# Patient Record
Sex: FEMALE | Race: WHITE | Employment: PART TIME | URBAN - METROPOLITAN AREA
[De-identification: names, ages, dates, MRNs, and addresses within clinical notes are randomized per-mention and may not be internally consistent; named-entity substitution may affect disease eponyms.]

---

## 2018-06-14 ENCOUNTER — HOSPITAL ENCOUNTER (OUTPATIENT)
Dept: INFUSION THERAPY | Age: 48
Discharge: HOME | End: 2018-06-14
Payer: COMMERCIAL

## 2018-06-14 ENCOUNTER — OFFICE VISIT (OUTPATIENT)
Dept: ONCOLOGY | Age: 48
End: 2018-06-14
Payer: COMMERCIAL

## 2018-06-14 VITALS
HEIGHT: 62 IN | DIASTOLIC BLOOD PRESSURE: 83 MMHG | TEMPERATURE: 97.6 F | RESPIRATION RATE: 20 BRPM | BODY MASS INDEX: 32.3 KG/M2 | SYSTOLIC BLOOD PRESSURE: 119 MMHG | WEIGHT: 175.5 LBS | HEART RATE: 76 BPM

## 2018-06-14 DIAGNOSIS — Z17.0 MALIGNANT NEOPLASM OF LEFT BREAST IN FEMALE, ESTROGEN RECEPTOR POSITIVE, UNSPECIFIED SITE OF BREAST (HCC): ICD-10-CM

## 2018-06-14 DIAGNOSIS — C50.012 MALIGNANT NEOPLASM OF NIPPLE OF LEFT BREAST IN FEMALE, UNSPECIFIED ESTROGEN RECEPTOR STATUS (HCC): Chronic | ICD-10-CM

## 2018-06-14 DIAGNOSIS — C50.912 MALIGNANT NEOPLASM OF LEFT BREAST IN FEMALE, ESTROGEN RECEPTOR POSITIVE, UNSPECIFIED SITE OF BREAST (HCC): ICD-10-CM

## 2018-06-14 DIAGNOSIS — C50.012 MALIGNANT NEOPLASM OF NIPPLE OF LEFT BREAST IN FEMALE, UNSPECIFIED ESTROGEN RECEPTOR STATUS (HCC): Primary | Chronic | ICD-10-CM

## 2018-06-14 LAB
ALBUMIN SERPL-MCNC: 4.7 G/DL (ref 3.5–5.2)
ALP BLD-CCNC: 71 U/L (ref 35–104)
ALT SERPL-CCNC: 17 U/L (ref 0–32)
ANION GAP SERPL CALCULATED.3IONS-SCNC: 11 MMOL/L (ref 7–16)
AST SERPL-CCNC: 17 U/L (ref 0–31)
BASOPHILS ABSOLUTE: 0.03 E9/L (ref 0–0.2)
BASOPHILS RELATIVE PERCENT: 0.3 % (ref 0–2)
BILIRUB SERPL-MCNC: 0.3 MG/DL (ref 0–1.2)
BUN BLDV-MCNC: 16 MG/DL (ref 6–20)
CALCIUM SERPL-MCNC: 9.9 MG/DL (ref 8.6–10.2)
CHLORIDE BLD-SCNC: 102 MMOL/L (ref 98–107)
CO2: 30 MMOL/L (ref 22–29)
CREAT SERPL-MCNC: 0.8 MG/DL (ref 0.5–1)
EOSINOPHILS ABSOLUTE: 0.31 E9/L (ref 0.05–0.5)
EOSINOPHILS RELATIVE PERCENT: 3.6 % (ref 0–6)
GFR AFRICAN AMERICAN: >60
GFR NON-AFRICAN AMERICAN: >60 ML/MIN/1.73
GLUCOSE BLD-MCNC: 90 MG/DL (ref 74–109)
HCT VFR BLD CALC: 40.3 % (ref 34–48)
HEMOGLOBIN: 13.2 G/DL (ref 11.5–15.5)
IMMATURE GRANULOCYTES #: 0.02 E9/L
IMMATURE GRANULOCYTES %: 0.2 % (ref 0–5)
LYMPHOCYTES ABSOLUTE: 2.58 E9/L (ref 1.5–4)
LYMPHOCYTES RELATIVE PERCENT: 29.6 % (ref 20–42)
MCH RBC QN AUTO: 29.9 PG (ref 26–35)
MCHC RBC AUTO-ENTMCNC: 32.8 % (ref 32–34.5)
MCV RBC AUTO: 91.4 FL (ref 80–99.9)
MONOCYTES ABSOLUTE: 0.42 E9/L (ref 0.1–0.95)
MONOCYTES RELATIVE PERCENT: 4.8 % (ref 2–12)
NEUTROPHILS ABSOLUTE: 5.36 E9/L (ref 1.8–7.3)
NEUTROPHILS RELATIVE PERCENT: 61.5 % (ref 43–80)
PDW BLD-RTO: 13.4 FL (ref 11.5–15)
PLATELET # BLD: 309 E9/L (ref 130–450)
PMV BLD AUTO: 11.3 FL (ref 7–12)
POTASSIUM SERPL-SCNC: 3.9 MMOL/L (ref 3.5–5)
RBC # BLD: 4.41 E12/L (ref 3.5–5.5)
SODIUM BLD-SCNC: 143 MMOL/L (ref 132–146)
TOTAL PROTEIN: 7.6 G/DL (ref 6.4–8.3)
WBC # BLD: 8.7 E9/L (ref 4.5–11.5)

## 2018-06-14 PROCEDURE — 96372 THER/PROPH/DIAG INJ SC/IM: CPT

## 2018-06-14 PROCEDURE — 6360000002 HC RX W HCPCS: Performed by: INTERNAL MEDICINE

## 2018-06-14 PROCEDURE — 85025 COMPLETE CBC W/AUTO DIFF WBC: CPT

## 2018-06-14 PROCEDURE — 80053 COMPREHEN METABOLIC PANEL: CPT

## 2018-06-14 PROCEDURE — 99214 OFFICE O/P EST MOD 30 MIN: CPT | Performed by: INTERNAL MEDICINE

## 2018-06-14 RX ADMIN — DENOSUMAB 60 MG: 60 INJECTION SUBCUTANEOUS at 14:23

## 2018-10-25 ENCOUNTER — HOSPITAL ENCOUNTER (OUTPATIENT)
Age: 48
Discharge: HOME | End: 2018-10-25
Payer: COMMERCIAL

## 2018-10-25 ENCOUNTER — HOSPITAL ENCOUNTER (INPATIENT)
Age: 48
LOS: 4 days | Discharge: HOME | DRG: 200 | End: 2018-10-29
Attending: EMERGENCY MEDICINE | Admitting: SURGERY
Payer: COMMERCIAL

## 2018-10-25 ENCOUNTER — APPOINTMENT (OUTPATIENT)
Dept: CT IMAGING | Age: 48
End: 2018-10-25
Payer: COMMERCIAL

## 2018-10-25 ENCOUNTER — HOSPITAL ENCOUNTER (EMERGENCY)
Age: 48
Discharge: ANOTHER HOSPITAL | End: 2018-10-25
Attending: EMERGENCY MEDICINE
Payer: COMMERCIAL

## 2018-10-25 ENCOUNTER — APPOINTMENT (OUTPATIENT)
Dept: GENERAL RADIOLOGY | Age: 48
End: 2018-10-25
Payer: COMMERCIAL

## 2018-10-25 VITALS
HEART RATE: 82 BPM | BODY MASS INDEX: 32.2 KG/M2 | SYSTOLIC BLOOD PRESSURE: 121 MMHG | TEMPERATURE: 98 F | OXYGEN SATURATION: 98 % | DIASTOLIC BLOOD PRESSURE: 73 MMHG | WEIGHT: 175 LBS | HEIGHT: 62 IN | RESPIRATION RATE: 16 BRPM

## 2018-10-25 DIAGNOSIS — J93.9 PNEUMOTHORAX, LEFT: ICD-10-CM

## 2018-10-25 DIAGNOSIS — S22.42XA CLOSED FRACTURE OF MULTIPLE RIBS OF LEFT SIDE, INITIAL ENCOUNTER: Primary | ICD-10-CM

## 2018-10-25 DIAGNOSIS — S27.0XXA TRAUMATIC PNEUMOTHORAX, INITIAL ENCOUNTER: ICD-10-CM

## 2018-10-25 DIAGNOSIS — S22.42XA CLOSED FRACTURE OF FOUR RIBS OF LEFT SIDE, INITIAL ENCOUNTER: ICD-10-CM

## 2018-10-25 LAB
ALBUMIN SERPL-MCNC: 5 G/DL (ref 3.5–5.2)
ALP BLD-CCNC: 53 U/L (ref 35–104)
ALT SERPL-CCNC: 15 U/L (ref 0–32)
ANION GAP SERPL CALCULATED.3IONS-SCNC: 12 MMOL/L (ref 7–16)
APTT: 23.6 SEC (ref 24.5–35.1)
AST SERPL-CCNC: 18 U/L (ref 0–31)
BASOPHILS ABSOLUTE: 0.04 E9/L (ref 0–0.2)
BASOPHILS RELATIVE PERCENT: 0.3 % (ref 0–2)
BILIRUB SERPL-MCNC: 0.2 MG/DL (ref 0–1.2)
BUN BLDV-MCNC: 22 MG/DL (ref 6–20)
CALCIUM SERPL-MCNC: 9.7 MG/DL (ref 8.6–10.2)
CHLORIDE BLD-SCNC: 101 MMOL/L (ref 98–107)
CO2: 28 MMOL/L (ref 22–29)
CREAT SERPL-MCNC: 1.1 MG/DL (ref 0.5–1)
EOSINOPHILS ABSOLUTE: 0.19 E9/L (ref 0.05–0.5)
EOSINOPHILS RELATIVE PERCENT: 1.4 % (ref 0–6)
GFR AFRICAN AMERICAN: >60
GFR NON-AFRICAN AMERICAN: 53 ML/MIN/1.73
GLUCOSE BLD-MCNC: 131 MG/DL (ref 74–109)
HCT VFR BLD CALC: 40.2 % (ref 34–48)
HEMOGLOBIN: 13.4 G/DL (ref 11.5–15.5)
IMMATURE GRANULOCYTES #: 0.06 E9/L
IMMATURE GRANULOCYTES %: 0.4 % (ref 0–5)
INR BLD: 1.1
LYMPHOCYTES ABSOLUTE: 2.37 E9/L (ref 1.5–4)
LYMPHOCYTES RELATIVE PERCENT: 17.5 % (ref 20–42)
MCH RBC QN AUTO: 30.2 PG (ref 26–35)
MCHC RBC AUTO-ENTMCNC: 33.3 % (ref 32–34.5)
MCV RBC AUTO: 90.5 FL (ref 80–99.9)
MONOCYTES ABSOLUTE: 0.62 E9/L (ref 0.1–0.95)
MONOCYTES RELATIVE PERCENT: 4.6 % (ref 2–12)
NEUTROPHILS ABSOLUTE: 10.28 E9/L (ref 1.8–7.3)
NEUTROPHILS RELATIVE PERCENT: 75.8 % (ref 43–80)
PDW BLD-RTO: 13 FL (ref 11.5–15)
PLATELET # BLD: 297 E9/L (ref 130–450)
PMV BLD AUTO: 11.7 FL (ref 7–12)
POTASSIUM SERPL-SCNC: 4.5 MMOL/L (ref 3.5–5)
PROTHROMBIN TIME: 13 SEC (ref 9.3–12.4)
RBC # BLD: 4.44 E12/L (ref 3.5–5.5)
SODIUM BLD-SCNC: 141 MMOL/L (ref 132–146)
TOTAL PROTEIN: 7.9 G/DL (ref 6.4–8.3)
WBC # BLD: 13.6 E9/L (ref 4.5–11.5)

## 2018-10-25 PROCEDURE — 71045 X-RAY EXAM CHEST 1 VIEW: CPT

## 2018-10-25 PROCEDURE — A0425 GROUND MILEAGE: HCPCS

## 2018-10-25 PROCEDURE — 99285 EMERGENCY DEPT VISIT HI MDM: CPT

## 2018-10-25 PROCEDURE — 70450 CT HEAD/BRAIN W/O DYE: CPT

## 2018-10-25 PROCEDURE — 6360000002 HC RX W HCPCS: Performed by: EMERGENCY MEDICINE

## 2018-10-25 PROCEDURE — 85730 THROMBOPLASTIN TIME PARTIAL: CPT

## 2018-10-25 PROCEDURE — 2580000003 HC RX 258: Performed by: EMERGENCY MEDICINE

## 2018-10-25 PROCEDURE — 72125 CT NECK SPINE W/O DYE: CPT

## 2018-10-25 PROCEDURE — 6360000002 HC RX W HCPCS

## 2018-10-25 PROCEDURE — 96376 TX/PRO/DX INJ SAME DRUG ADON: CPT

## 2018-10-25 PROCEDURE — 6360000004 HC RX CONTRAST MEDICATION: Performed by: RADIOLOGY

## 2018-10-25 PROCEDURE — 96374 THER/PROPH/DIAG INJ IV PUSH: CPT

## 2018-10-25 PROCEDURE — 80053 COMPREHEN METABOLIC PANEL: CPT

## 2018-10-25 PROCEDURE — 71260 CT THORAX DX C+: CPT

## 2018-10-25 PROCEDURE — 96375 TX/PRO/DX INJ NEW DRUG ADDON: CPT

## 2018-10-25 PROCEDURE — 85025 COMPLETE CBC W/AUTO DIFF WBC: CPT

## 2018-10-25 PROCEDURE — 74177 CT ABD & PELVIS W/CONTRAST: CPT

## 2018-10-25 PROCEDURE — 94761 N-INVAS EAR/PLS OXIMETRY MLT: CPT

## 2018-10-25 PROCEDURE — 85610 PROTHROMBIN TIME: CPT

## 2018-10-25 PROCEDURE — 2060000000 HC ICU INTERMEDIATE R&B

## 2018-10-25 PROCEDURE — A0426 ALS 1: HCPCS

## 2018-10-25 RX ORDER — MORPHINE SULFATE 2 MG/ML
2 INJECTION, SOLUTION INTRAMUSCULAR; INTRAVENOUS ONCE
Status: COMPLETED | OUTPATIENT
Start: 2018-10-25 | End: 2018-10-25

## 2018-10-25 RX ORDER — LIDOCAINE 50 MG/G
1 PATCH TOPICAL DAILY
Qty: 30 PATCH | Refills: 0 | Status: ON HOLD | OUTPATIENT
Start: 2018-10-25 | End: 2018-10-29 | Stop reason: HOSPADM

## 2018-10-25 RX ORDER — FENTANYL CITRATE 50 UG/ML
50 INJECTION, SOLUTION INTRAMUSCULAR; INTRAVENOUS ONCE
Status: DISCONTINUED | OUTPATIENT
Start: 2018-10-25 | End: 2018-10-26

## 2018-10-25 RX ORDER — 0.9 % SODIUM CHLORIDE 0.9 %
1000 INTRAVENOUS SOLUTION INTRAVENOUS ONCE
Status: COMPLETED | OUTPATIENT
Start: 2018-10-25 | End: 2018-10-25

## 2018-10-25 RX ORDER — FENTANYL CITRATE 50 UG/ML
50 INJECTION, SOLUTION INTRAMUSCULAR; INTRAVENOUS
Status: DISCONTINUED | OUTPATIENT
Start: 2018-10-25 | End: 2018-10-25 | Stop reason: HOSPADM

## 2018-10-25 RX ORDER — DICLOFENAC SODIUM 75 MG/1
75 TABLET, DELAYED RELEASE ORAL 2 TIMES DAILY
Qty: 20 TABLET | Refills: 0 | Status: SHIPPED | OUTPATIENT
Start: 2018-10-25 | End: 2019-11-08

## 2018-10-25 RX ORDER — MORPHINE SULFATE 2 MG/ML
INJECTION, SOLUTION INTRAMUSCULAR; INTRAVENOUS
Status: COMPLETED
Start: 2018-10-25 | End: 2018-10-25

## 2018-10-25 RX ORDER — ONDANSETRON 2 MG/ML
4 INJECTION INTRAMUSCULAR; INTRAVENOUS ONCE
Status: COMPLETED | OUTPATIENT
Start: 2018-10-25 | End: 2018-10-25

## 2018-10-25 RX ADMIN — FENTANYL CITRATE 50 MCG: 50 INJECTION INTRAMUSCULAR; INTRAVENOUS at 18:22

## 2018-10-25 RX ADMIN — SODIUM CHLORIDE 1000 ML: 9 INJECTION, SOLUTION INTRAVENOUS at 18:25

## 2018-10-25 RX ADMIN — MORPHINE SULFATE 2 MG: 2 INJECTION, SOLUTION INTRAMUSCULAR; INTRAVENOUS at 23:26

## 2018-10-25 RX ADMIN — ONDANSETRON 4 MG: 2 INJECTION INTRAMUSCULAR; INTRAVENOUS at 18:22

## 2018-10-25 RX ADMIN — IOPAMIDOL 110 ML: 755 INJECTION, SOLUTION INTRAVENOUS at 20:02

## 2018-10-25 RX ADMIN — FENTANYL CITRATE 50 MCG: 50 INJECTION INTRAMUSCULAR; INTRAVENOUS at 21:23

## 2018-10-25 ASSESSMENT — PAIN DESCRIPTION - PAIN TYPE: TYPE: ACUTE PAIN

## 2018-10-25 ASSESSMENT — PAIN SCALES - GENERAL
PAINLEVEL_OUTOF10: 9
PAINLEVEL_OUTOF10: 10

## 2018-10-25 ASSESSMENT — PAIN DESCRIPTION - LOCATION: LOCATION: RIB CAGE

## 2018-10-25 ASSESSMENT — PAIN DESCRIPTION - DESCRIPTORS: DESCRIPTORS: SHARP

## 2018-10-26 ENCOUNTER — APPOINTMENT (OUTPATIENT)
Dept: GENERAL RADIOLOGY | Age: 48
DRG: 200 | End: 2018-10-26
Payer: COMMERCIAL

## 2018-10-26 LAB
ALBUMIN SERPL-MCNC: 4.4 G/DL (ref 3.5–5.2)
ALP BLD-CCNC: 49 U/L (ref 35–104)
ALT SERPL-CCNC: 12 U/L (ref 0–32)
ANION GAP SERPL CALCULATED.3IONS-SCNC: 13 MMOL/L (ref 7–16)
AST SERPL-CCNC: 12 U/L (ref 0–31)
BASOPHILS ABSOLUTE: 0.03 E9/L (ref 0–0.2)
BASOPHILS RELATIVE PERCENT: 0.3 % (ref 0–2)
BILIRUB SERPL-MCNC: 0.3 MG/DL (ref 0–1.2)
BUN BLDV-MCNC: 17 MG/DL (ref 6–20)
CALCIUM SERPL-MCNC: 8.9 MG/DL (ref 8.6–10.2)
CHLORIDE BLD-SCNC: 100 MMOL/L (ref 98–107)
CO2: 24 MMOL/L (ref 22–29)
CREAT SERPL-MCNC: 0.8 MG/DL (ref 0.5–1)
EOSINOPHILS ABSOLUTE: 0.18 E9/L (ref 0.05–0.5)
EOSINOPHILS RELATIVE PERCENT: 1.7 % (ref 0–6)
GFR AFRICAN AMERICAN: >60
GFR NON-AFRICAN AMERICAN: >60 ML/MIN/1.73
GLUCOSE BLD-MCNC: 109 MG/DL (ref 74–109)
HCT VFR BLD CALC: 36.2 % (ref 34–48)
HEMOGLOBIN: 11.7 G/DL (ref 11.5–15.5)
IMMATURE GRANULOCYTES #: 0.04 E9/L
IMMATURE GRANULOCYTES %: 0.4 % (ref 0–5)
LYMPHOCYTES ABSOLUTE: 2.25 E9/L (ref 1.5–4)
LYMPHOCYTES RELATIVE PERCENT: 20.9 % (ref 20–42)
MCH RBC QN AUTO: 29.4 PG (ref 26–35)
MCHC RBC AUTO-ENTMCNC: 32.3 % (ref 32–34.5)
MCV RBC AUTO: 91 FL (ref 80–99.9)
MONOCYTES ABSOLUTE: 0.55 E9/L (ref 0.1–0.95)
MONOCYTES RELATIVE PERCENT: 5.1 % (ref 2–12)
NEUTROPHILS ABSOLUTE: 7.69 E9/L (ref 1.8–7.3)
NEUTROPHILS RELATIVE PERCENT: 71.6 % (ref 43–80)
PDW BLD-RTO: 13 FL (ref 11.5–15)
PLATELET # BLD: 273 E9/L (ref 130–450)
PMV BLD AUTO: 11.6 FL (ref 7–12)
POTASSIUM REFLEX MAGNESIUM: 4.1 MMOL/L (ref 3.5–5)
RBC # BLD: 3.98 E12/L (ref 3.5–5.5)
SODIUM BLD-SCNC: 137 MMOL/L (ref 132–146)
TOTAL PROTEIN: 7 G/DL (ref 6.4–8.3)
WBC # BLD: 10.7 E9/L (ref 4.5–11.5)

## 2018-10-26 PROCEDURE — 6360000002 HC RX W HCPCS: Performed by: STUDENT IN AN ORGANIZED HEALTH CARE EDUCATION/TRAINING PROGRAM

## 2018-10-26 PROCEDURE — 97530 THERAPEUTIC ACTIVITIES: CPT

## 2018-10-26 PROCEDURE — 6370000000 HC RX 637 (ALT 250 FOR IP): Performed by: STUDENT IN AN ORGANIZED HEALTH CARE EDUCATION/TRAINING PROGRAM

## 2018-10-26 PROCEDURE — G8978 MOBILITY CURRENT STATUS: HCPCS

## 2018-10-26 PROCEDURE — 97166 OT EVAL MOD COMPLEX 45 MIN: CPT

## 2018-10-26 PROCEDURE — 2580000003 HC RX 258: Performed by: STUDENT IN AN ORGANIZED HEALTH CARE EDUCATION/TRAINING PROGRAM

## 2018-10-26 PROCEDURE — 2060000000 HC ICU INTERMEDIATE R&B

## 2018-10-26 PROCEDURE — 92523 SPEECH SOUND LANG COMPREHEN: CPT

## 2018-10-26 PROCEDURE — G8979 MOBILITY GOAL STATUS: HCPCS

## 2018-10-26 PROCEDURE — G8988 SELF CARE GOAL STATUS: HCPCS

## 2018-10-26 PROCEDURE — 6360000002 HC RX W HCPCS: Performed by: EMERGENCY MEDICINE

## 2018-10-26 PROCEDURE — G8987 SELF CARE CURRENT STATUS: HCPCS

## 2018-10-26 PROCEDURE — 97161 PT EVAL LOW COMPLEX 20 MIN: CPT

## 2018-10-26 PROCEDURE — 99231 SBSQ HOSP IP/OBS SF/LOW 25: CPT | Performed by: SURGERY

## 2018-10-26 PROCEDURE — 97535 SELF CARE MNGMENT TRAINING: CPT

## 2018-10-26 PROCEDURE — 80053 COMPREHEN METABOLIC PANEL: CPT

## 2018-10-26 PROCEDURE — 36415 COLL VENOUS BLD VENIPUNCTURE: CPT

## 2018-10-26 PROCEDURE — 85025 COMPLETE CBC W/AUTO DIFF WBC: CPT

## 2018-10-26 PROCEDURE — 71045 X-RAY EXAM CHEST 1 VIEW: CPT

## 2018-10-26 PROCEDURE — 2700000000 HC OXYGEN THERAPY PER DAY

## 2018-10-26 RX ORDER — TOPIRAMATE 25 MG/1
25 TABLET ORAL NIGHTLY
Status: DISCONTINUED | OUTPATIENT
Start: 2018-10-26 | End: 2018-10-29 | Stop reason: HOSPADM

## 2018-10-26 RX ORDER — LIDOCAINE 50 MG/G
1 PATCH TOPICAL DAILY
Status: DISCONTINUED | OUTPATIENT
Start: 2018-10-26 | End: 2018-10-29 | Stop reason: HOSPADM

## 2018-10-26 RX ORDER — DOCUSATE SODIUM 100 MG/1
100 CAPSULE, LIQUID FILLED ORAL 2 TIMES DAILY
Status: DISCONTINUED | OUTPATIENT
Start: 2018-10-26 | End: 2018-10-29 | Stop reason: HOSPADM

## 2018-10-26 RX ORDER — OXYCODONE HYDROCHLORIDE 5 MG/1
5 TABLET ORAL EVERY 4 HOURS PRN
Status: DISCONTINUED | OUTPATIENT
Start: 2018-10-26 | End: 2018-10-28

## 2018-10-26 RX ORDER — DULOXETIN HYDROCHLORIDE 20 MG/1
20 CAPSULE, DELAYED RELEASE ORAL DAILY
Status: DISCONTINUED | OUTPATIENT
Start: 2018-10-26 | End: 2018-10-29 | Stop reason: HOSPADM

## 2018-10-26 RX ORDER — SODIUM CHLORIDE 0.9 % (FLUSH) 0.9 %
10 SYRINGE (ML) INJECTION PRN
Status: DISCONTINUED | OUTPATIENT
Start: 2018-10-26 | End: 2018-10-29 | Stop reason: HOSPADM

## 2018-10-26 RX ORDER — MORPHINE SULFATE 4 MG/ML
4 INJECTION, SOLUTION INTRAMUSCULAR; INTRAVENOUS ONCE
Status: COMPLETED | OUTPATIENT
Start: 2018-10-26 | End: 2018-10-26

## 2018-10-26 RX ORDER — ACETAMINOPHEN 325 MG/1
650 TABLET ORAL EVERY 4 HOURS
Status: DISCONTINUED | OUTPATIENT
Start: 2018-10-26 | End: 2018-10-29 | Stop reason: HOSPADM

## 2018-10-26 RX ORDER — MORPHINE SULFATE 4 MG/ML
4 INJECTION, SOLUTION INTRAMUSCULAR; INTRAVENOUS
Status: DISCONTINUED | OUTPATIENT
Start: 2018-10-26 | End: 2018-10-28

## 2018-10-26 RX ORDER — ONDANSETRON 2 MG/ML
4 INJECTION INTRAMUSCULAR; INTRAVENOUS EVERY 6 HOURS PRN
Status: DISCONTINUED | OUTPATIENT
Start: 2018-10-26 | End: 2018-10-29 | Stop reason: HOSPADM

## 2018-10-26 RX ORDER — MORPHINE SULFATE 2 MG/ML
2 INJECTION, SOLUTION INTRAMUSCULAR; INTRAVENOUS
Status: DISCONTINUED | OUTPATIENT
Start: 2018-10-26 | End: 2018-10-28

## 2018-10-26 RX ORDER — METHOCARBAMOL 750 MG/1
750 TABLET, FILM COATED ORAL 4 TIMES DAILY
Status: DISCONTINUED | OUTPATIENT
Start: 2018-10-26 | End: 2018-10-29 | Stop reason: HOSPADM

## 2018-10-26 RX ORDER — OXYCODONE HYDROCHLORIDE 5 MG/1
10 TABLET ORAL EVERY 4 HOURS PRN
Status: DISCONTINUED | OUTPATIENT
Start: 2018-10-26 | End: 2018-10-28

## 2018-10-26 RX ORDER — M-VIT,TX,IRON,MINS/CALC/FOLIC 27MG-0.4MG
1 TABLET ORAL NIGHTLY
Status: DISCONTINUED | OUTPATIENT
Start: 2018-10-26 | End: 2018-10-26 | Stop reason: CLARIF

## 2018-10-26 RX ORDER — DIPHENHYDRAMINE HCL 25 MG
50 TABLET ORAL NIGHTLY
Status: DISCONTINUED | OUTPATIENT
Start: 2018-10-26 | End: 2018-10-29 | Stop reason: HOSPADM

## 2018-10-26 RX ORDER — M-VIT,TX,IRON,MINS/CALC/FOLIC 27MG-0.4MG
1 TABLET ORAL DAILY
Status: DISCONTINUED | OUTPATIENT
Start: 2018-10-26 | End: 2018-10-29 | Stop reason: HOSPADM

## 2018-10-26 RX ORDER — LETROZOLE 2.5 MG/1
2.5 TABLET, FILM COATED ORAL DAILY
Status: DISCONTINUED | OUTPATIENT
Start: 2018-10-26 | End: 2018-10-29 | Stop reason: HOSPADM

## 2018-10-26 RX ORDER — SODIUM CHLORIDE 0.9 % (FLUSH) 0.9 %
10 SYRINGE (ML) INJECTION EVERY 12 HOURS SCHEDULED
Status: DISCONTINUED | OUTPATIENT
Start: 2018-10-26 | End: 2018-10-29 | Stop reason: HOSPADM

## 2018-10-26 RX ORDER — GABAPENTIN 100 MG/1
100 CAPSULE ORAL 2 TIMES DAILY
Status: DISCONTINUED | OUTPATIENT
Start: 2018-10-26 | End: 2018-10-29 | Stop reason: HOSPADM

## 2018-10-26 RX ADMIN — DIPHENHYDRAMINE HCL 50 MG: 25 TABLET ORAL at 01:36

## 2018-10-26 RX ADMIN — ACETAMINOPHEN 650 MG: 325 TABLET, FILM COATED ORAL at 17:20

## 2018-10-26 RX ADMIN — OXYCODONE HYDROCHLORIDE 10 MG: 5 TABLET ORAL at 13:27

## 2018-10-26 RX ADMIN — DOCUSATE SODIUM 100 MG: 100 CAPSULE, LIQUID FILLED ORAL at 20:12

## 2018-10-26 RX ADMIN — METHOCARBAMOL 750 MG: 750 TABLET ORAL at 13:26

## 2018-10-26 RX ADMIN — GABAPENTIN 100 MG: 100 CAPSULE ORAL at 17:20

## 2018-10-26 RX ADMIN — ACETAMINOPHEN 650 MG: 325 TABLET, FILM COATED ORAL at 13:26

## 2018-10-26 RX ADMIN — METHOCARBAMOL 750 MG: 750 TABLET ORAL at 08:44

## 2018-10-26 RX ADMIN — DULOXETINE HYDROCHLORIDE 20 MG: 20 CAPSULE, DELAYED RELEASE ORAL at 08:44

## 2018-10-26 RX ADMIN — ACETAMINOPHEN 650 MG: 325 TABLET, FILM COATED ORAL at 05:43

## 2018-10-26 RX ADMIN — MORPHINE SULFATE 4 MG: 4 INJECTION, SOLUTION INTRAMUSCULAR; INTRAVENOUS at 00:21

## 2018-10-26 RX ADMIN — ACETAMINOPHEN 650 MG: 325 TABLET, FILM COATED ORAL at 01:37

## 2018-10-26 RX ADMIN — ACETAMINOPHEN 650 MG: 325 TABLET, FILM COATED ORAL at 20:11

## 2018-10-26 RX ADMIN — MULTIPLE VITAMINS W/ MINERALS TAB 1 TABLET: TAB at 08:44

## 2018-10-26 RX ADMIN — DOCUSATE SODIUM 100 MG: 100 CAPSULE, LIQUID FILLED ORAL at 01:37

## 2018-10-26 RX ADMIN — ENOXAPARIN SODIUM 30 MG: 30 INJECTION SUBCUTANEOUS at 08:43

## 2018-10-26 RX ADMIN — METHOCARBAMOL 750 MG: 750 TABLET ORAL at 17:21

## 2018-10-26 RX ADMIN — Medication 10 ML: at 08:45

## 2018-10-26 RX ADMIN — DIPHENHYDRAMINE HCL 50 MG: 25 TABLET ORAL at 20:11

## 2018-10-26 RX ADMIN — OXYCODONE HYDROCHLORIDE 10 MG: 5 TABLET ORAL at 08:44

## 2018-10-26 RX ADMIN — LETROZOLE 2.5 MG: 2.5 TABLET ORAL at 08:44

## 2018-10-26 RX ADMIN — ACETAMINOPHEN 650 MG: 325 TABLET, FILM COATED ORAL at 08:44

## 2018-10-26 RX ADMIN — OXYCODONE HYDROCHLORIDE 10 MG: 5 TABLET ORAL at 21:24

## 2018-10-26 RX ADMIN — Medication 10 ML: at 19:49

## 2018-10-26 RX ADMIN — OXYCODONE HYDROCHLORIDE 10 MG: 5 TABLET ORAL at 17:23

## 2018-10-26 RX ADMIN — METHOCARBAMOL 750 MG: 750 TABLET ORAL at 20:12

## 2018-10-26 RX ADMIN — DOCUSATE SODIUM 100 MG: 100 CAPSULE, LIQUID FILLED ORAL at 08:44

## 2018-10-26 RX ADMIN — GABAPENTIN 100 MG: 100 CAPSULE ORAL at 08:44

## 2018-10-26 RX ADMIN — Medication 1 MG: at 20:11

## 2018-10-26 RX ADMIN — OXYCODONE HYDROCHLORIDE 10 MG: 5 TABLET ORAL at 01:36

## 2018-10-26 RX ADMIN — MORPHINE SULFATE 4 MG: 4 INJECTION INTRAVENOUS at 05:43

## 2018-10-26 RX ADMIN — ENOXAPARIN SODIUM 30 MG: 30 INJECTION SUBCUTANEOUS at 20:13

## 2018-10-26 RX ADMIN — Medication 10 ML: at 20:12

## 2018-10-26 RX ADMIN — MORPHINE SULFATE 2 MG: 2 INJECTION, SOLUTION INTRAMUSCULAR; INTRAVENOUS at 19:49

## 2018-10-26 ASSESSMENT — PAIN SCALES - GENERAL
PAINLEVEL_OUTOF10: 9
PAINLEVEL_OUTOF10: 8
PAINLEVEL_OUTOF10: 10
PAINLEVEL_OUTOF10: 10
PAINLEVEL_OUTOF10: 8
PAINLEVEL_OUTOF10: 8
PAINLEVEL_OUTOF10: 10
PAINLEVEL_OUTOF10: 10
PAINLEVEL_OUTOF10: 0
PAINLEVEL_OUTOF10: 8
PAINLEVEL_OUTOF10: 0
PAINLEVEL_OUTOF10: 9
PAINLEVEL_OUTOF10: 9
PAINLEVEL_OUTOF10: 0
PAINLEVEL_OUTOF10: 2
PAINLEVEL_OUTOF10: 8
PAINLEVEL_OUTOF10: 9
PAINLEVEL_OUTOF10: 0

## 2018-10-26 ASSESSMENT — PAIN DESCRIPTION - ORIENTATION
ORIENTATION: LEFT

## 2018-10-26 ASSESSMENT — PAIN DESCRIPTION - PAIN TYPE
TYPE: ACUTE PAIN

## 2018-10-26 ASSESSMENT — PAIN DESCRIPTION - LOCATION
LOCATION: RIB CAGE

## 2018-10-26 ASSESSMENT — PAIN DESCRIPTION - FREQUENCY
FREQUENCY: CONTINUOUS

## 2018-10-26 ASSESSMENT — PAIN DESCRIPTION - PROGRESSION: CLINICAL_PROGRESSION: NOT CHANGED

## 2018-10-26 ASSESSMENT — PAIN DESCRIPTION - ONSET
ONSET: ON-GOING

## 2018-10-26 NOTE — PROGRESS NOTES
Nsg aware  Additional Complaints:  None    Comments:  Upon arrival, pt was found semi-supine in bed. She was agreeable to participate in assessment ax. Her  was present for 2nd half of assessment. Received permission from RN prior to engaging pt in assessment ax. Cognition:  A & O x 4  Ability to Follow Multi-Step Commands:  Good   Problem solving skills:  Good  Memory:  Good  Additional Comments:  Pt was pleasant, cooperative, Good Insight, Motivated to participate in therapy    Visual-Perceptual:   Hearing: WNL  Hearing Aids:  No  Vision:   WNL  Glasses:  Reading     UE Assessment:  Hand Dominance:  Right     Strength ROM Additional Info:    RUE   WNL - NT d/t pain level, rib pain WNL - observed Good    Good FMC/dexterity noted during ADL tasks     LUE WNL - NT WNL - observed Good    Good FMC/dexterity noted during ADL tasks     Sensation:  Denies numbness and tingling Nasir UEs  Tone: WNL Bilateral UEs    Edema:  None noted     Functional Assessment:   Initial Status 10-26-18 Comments   Feeding  IND    Grooming  SUP/Set up Pt able to brush teeth, wash hands while standing at sink, very guarded with use of Left UE d/t pain w/ functional reaching, encouraged gentle AROM for functional tasks   Upper Body Dressing Mod A  Pt ed for adaptive tech to don bathrobe while standing, required Mod A d/t pain w/ functional reach bilateral UEs L>R   Lower Body Dressing Mod A Pt able to don socks w/ cross-legged tech with difficulty and increased time, pt ed for adaptive techs/equip - will review   Bathing NT    Toileting  NT    Bed Mobility  Min A + Min vcs for Log-rolling tech Rolling/Repositioning self in bed   Functional Transfers Close SUP/Min A for safety   Min A + Min VCs supine to sit  Close SUP sit<>stand from EOB, Chair   Functional Mobility Close SUP for safety + Line mgmt w/o AD > 150' Slightly unsteady, guarded d/t pain     Functional Sitting balance:   Good static/Good (-) dynamic seated EOB w/ Making- Moderate    Plan of Care:  ADL retraining X   Equipment needs: DME/Adaptive Equipment X   Neuromuscular re-education X Energy Conservation Techniques X  Functional Transfer training X  Patient and/or Family Education X  Functional Mobility training X  Environmental Modifications X  Cognitive re-training X   Compensatory techniques for ADLs X  Therapeutic Exercise X  Positioning to improve overall function X  Therapeutic Activity X   Other: X    Rehab Potential: Fair - limited by pain    Recommended Treatment Frequency: 3-5 days/week    Patient / Family Goal: Decrease pain    Pt/Family participated in the establishment of the following goals:      Short term goals  Time Frame: 1 week    GOAL (1)  Pt will complete Hygiene/Grooming with SUP/Set up while standing at the sink. GOAL (2)  Pt will complete UB ADLs with SUP/Set up w/ use of Adaptive equip/techs PRN  GOAL (3)  Pt will complete LB ADLs/Toileting with SUP/Set up w/ use of DME/AD/Adaptive equip/techs PRN. GOAL (4)  Pt will complete Functional Transfers and Functional Mobility with Remote SUP w/ use of DME/AD PRN. GOAL (5)  Pt will INDly Demo Good Safety Awareness/Adherence to safety precautions r/t rib fractures/Gentle AROM of Nasir UEs     Patient and/or family understands diagnosis, prognosis and plan of care: yes    Yes []  No [x]  Malnutrition indicators have been identified and nursing has been notified to ensure a dietitian consult is ordered. Time in: 0915  Time out: [address] Completed:   Moderate - 40 Minutes  Timed Treatment:  [facility], [facility], OTR/L  # [id number]

## 2018-10-26 NOTE — ED NOTES
Bed: 10  Expected date:   Expected time:   Means of arrival:   Comments:  ems     Edilma Thayer RN  10/25/18 8943

## 2018-10-26 NOTE — H&P
indwelling breast implants. Several acute left rib fractures with small left basilar pneumothorax, as described. Additional findings as noted above. ALERT: THIS IS AN ABNORMAL REPORT     Ct Cervical Spine Wo Contrast    Result Date: 10/25/2018  Patient MRN: [id number] : 1970 Age:  53 years Gender: Female Order Date: 10/25/2018 7:15 PM Exam: CT CERVICAL SPINE WO CONTRAST Number of Images: views Indication:   neck injury COMPARISON: None. TECHNIQUE:  Standard-protocol axial noncontrast CT scanning through cervical spine region. Multiplanar reconstruction images generated also. Images evaluated at bone window settings only for this dedicated-purpose study. IV CONTRAST:  None. FINDINGS: Cervical vertebral body heights intact. Odontoid process intact. No acute osseous cervical vertebral fracture seen. Overall cervical spine straightening or hypolordosis with multilevel minimal grade 1 spondylolisthesis. Multilevel findings of degenerative disc disease, degenerative arthritic change, and marginal vertebral osteophyte formation. Some multilevel relative cervical neuroforaminal stenosis due to associated osseous encroachment. No acute osseous cervical vertebral fracture evident. Correlate clinically for possible cervical muscle spasm or soft tissue injury. Multilevel cervical spondylosis changes. Ct Abdomen Pelvis W Iv Contrast Additional Contrast? None    Result Date: 10/25/2018  Patient MRN: [id number] : 1970 Age:  53 years Gender: Female Order Date: 10/25/2018 7:15 PM Exam: CT ABDOMEN PELVIS W IV CONTRAST Number of Images: views Indication:  Injury, fall. COMPARISON: None. TECHNIQUE:  Standard-protocol CT scanning performed throughout entire abdomen and pelvis. IV CONTRAST:  110 cc Isovue 370. ORAL CONTRAST:  None. Exam sensitivity decreased in some respects by absence of GI tract contrast. FINDINGS: Lung bases:  See separate dedicated chest CT exam report of same current date.  Abdominal aorta:

## 2018-10-27 ENCOUNTER — APPOINTMENT (OUTPATIENT)
Dept: GENERAL RADIOLOGY | Age: 48
DRG: 200 | End: 2018-10-27
Payer: COMMERCIAL

## 2018-10-27 LAB
ALBUMIN SERPL-MCNC: 4.2 G/DL (ref 3.5–5.2)
ALP BLD-CCNC: 47 U/L (ref 35–104)
ALT SERPL-CCNC: 10 U/L (ref 0–32)
ANION GAP SERPL CALCULATED.3IONS-SCNC: 11 MMOL/L (ref 7–16)
AST SERPL-CCNC: 10 U/L (ref 0–31)
BASOPHILS ABSOLUTE: 0.03 E9/L (ref 0–0.2)
BASOPHILS RELATIVE PERCENT: 0.3 % (ref 0–2)
BILIRUB SERPL-MCNC: 0.3 MG/DL (ref 0–1.2)
BUN BLDV-MCNC: 14 MG/DL (ref 6–20)
CALCIUM SERPL-MCNC: 8.4 MG/DL (ref 8.6–10.2)
CHLORIDE BLD-SCNC: 102 MMOL/L (ref 98–107)
CO2: 28 MMOL/L (ref 22–29)
CREAT SERPL-MCNC: 0.8 MG/DL (ref 0.5–1)
EOSINOPHILS ABSOLUTE: 0.27 E9/L (ref 0.05–0.5)
EOSINOPHILS RELATIVE PERCENT: 3 % (ref 0–6)
GFR AFRICAN AMERICAN: >60
GFR NON-AFRICAN AMERICAN: >60 ML/MIN/1.73
GLUCOSE BLD-MCNC: 112 MG/DL (ref 74–109)
HCT VFR BLD CALC: 37.3 % (ref 34–48)
HEMOGLOBIN: 11.9 G/DL (ref 11.5–15.5)
IMMATURE GRANULOCYTES #: 0.02 E9/L
IMMATURE GRANULOCYTES %: 0.2 % (ref 0–5)
LYMPHOCYTES ABSOLUTE: 2.53 E9/L (ref 1.5–4)
LYMPHOCYTES RELATIVE PERCENT: 27.9 % (ref 20–42)
MCH RBC QN AUTO: 29.7 PG (ref 26–35)
MCHC RBC AUTO-ENTMCNC: 31.9 % (ref 32–34.5)
MCV RBC AUTO: 93 FL (ref 80–99.9)
MONOCYTES ABSOLUTE: 0.61 E9/L (ref 0.1–0.95)
MONOCYTES RELATIVE PERCENT: 6.7 % (ref 2–12)
NEUTROPHILS ABSOLUTE: 5.61 E9/L (ref 1.8–7.3)
NEUTROPHILS RELATIVE PERCENT: 61.9 % (ref 43–80)
PDW BLD-RTO: 13.3 FL (ref 11.5–15)
PLATELET # BLD: 252 E9/L (ref 130–450)
PMV BLD AUTO: 11.4 FL (ref 7–12)
POTASSIUM REFLEX MAGNESIUM: 4 MMOL/L (ref 3.5–5)
RBC # BLD: 4.01 E12/L (ref 3.5–5.5)
SODIUM BLD-SCNC: 141 MMOL/L (ref 132–146)
TOTAL PROTEIN: 6.6 G/DL (ref 6.4–8.3)
WBC # BLD: 9.1 E9/L (ref 4.5–11.5)

## 2018-10-27 PROCEDURE — 71045 X-RAY EXAM CHEST 1 VIEW: CPT

## 2018-10-27 PROCEDURE — 36415 COLL VENOUS BLD VENIPUNCTURE: CPT

## 2018-10-27 PROCEDURE — 2700000000 HC OXYGEN THERAPY PER DAY

## 2018-10-27 PROCEDURE — 2580000003 HC RX 258: Performed by: STUDENT IN AN ORGANIZED HEALTH CARE EDUCATION/TRAINING PROGRAM

## 2018-10-27 PROCEDURE — 2060000000 HC ICU INTERMEDIATE R&B

## 2018-10-27 PROCEDURE — 80053 COMPREHEN METABOLIC PANEL: CPT

## 2018-10-27 PROCEDURE — 6370000000 HC RX 637 (ALT 250 FOR IP): Performed by: STUDENT IN AN ORGANIZED HEALTH CARE EDUCATION/TRAINING PROGRAM

## 2018-10-27 PROCEDURE — 6360000002 HC RX W HCPCS: Performed by: STUDENT IN AN ORGANIZED HEALTH CARE EDUCATION/TRAINING PROGRAM

## 2018-10-27 PROCEDURE — 85025 COMPLETE CBC W/AUTO DIFF WBC: CPT

## 2018-10-27 RX ADMIN — DIPHENHYDRAMINE HCL 50 MG: 25 TABLET ORAL at 20:20

## 2018-10-27 RX ADMIN — METHOCARBAMOL 750 MG: 750 TABLET ORAL at 16:41

## 2018-10-27 RX ADMIN — MORPHINE SULFATE 4 MG: 4 INJECTION INTRAVENOUS at 13:03

## 2018-10-27 RX ADMIN — MORPHINE SULFATE 2 MG: 2 INJECTION, SOLUTION INTRAMUSCULAR; INTRAVENOUS at 00:01

## 2018-10-27 RX ADMIN — OXYCODONE HYDROCHLORIDE 10 MG: 5 TABLET ORAL at 06:38

## 2018-10-27 RX ADMIN — METHOCARBAMOL 750 MG: 750 TABLET ORAL at 13:01

## 2018-10-27 RX ADMIN — METHOCARBAMOL 750 MG: 750 TABLET ORAL at 20:20

## 2018-10-27 RX ADMIN — METHOCARBAMOL 750 MG: 750 TABLET ORAL at 08:12

## 2018-10-27 RX ADMIN — DULOXETINE HYDROCHLORIDE 20 MG: 20 CAPSULE, DELAYED RELEASE ORAL at 08:12

## 2018-10-27 RX ADMIN — Medication 10 ML: at 15:30

## 2018-10-27 RX ADMIN — MORPHINE SULFATE 4 MG: 4 INJECTION INTRAVENOUS at 15:30

## 2018-10-27 RX ADMIN — ACETAMINOPHEN 650 MG: 325 TABLET, FILM COATED ORAL at 09:52

## 2018-10-27 RX ADMIN — Medication 10 ML: at 00:02

## 2018-10-27 RX ADMIN — Medication 10 ML: at 10:39

## 2018-10-27 RX ADMIN — MORPHINE SULFATE 4 MG: 4 INJECTION INTRAVENOUS at 10:39

## 2018-10-27 RX ADMIN — ACETAMINOPHEN 650 MG: 325 TABLET, FILM COATED ORAL at 13:48

## 2018-10-27 RX ADMIN — Medication 1 MG: at 20:22

## 2018-10-27 RX ADMIN — ENOXAPARIN SODIUM 30 MG: 30 INJECTION SUBCUTANEOUS at 20:20

## 2018-10-27 RX ADMIN — Medication 10 ML: at 13:03

## 2018-10-27 RX ADMIN — GABAPENTIN 100 MG: 100 CAPSULE ORAL at 17:55

## 2018-10-27 RX ADMIN — OXYCODONE HYDROCHLORIDE 10 MG: 5 TABLET ORAL at 01:35

## 2018-10-27 RX ADMIN — ACETAMINOPHEN 650 MG: 325 TABLET, FILM COATED ORAL at 06:38

## 2018-10-27 RX ADMIN — ONDANSETRON 4 MG: 2 INJECTION INTRAMUSCULAR; INTRAVENOUS at 20:21

## 2018-10-27 RX ADMIN — ACETAMINOPHEN 650 MG: 325 TABLET, FILM COATED ORAL at 17:55

## 2018-10-27 RX ADMIN — MORPHINE SULFATE 4 MG: 4 INJECTION INTRAVENOUS at 20:20

## 2018-10-27 RX ADMIN — MULTIPLE VITAMINS W/ MINERALS TAB 1 TABLET: TAB at 08:12

## 2018-10-27 RX ADMIN — LETROZOLE 2.5 MG: 2.5 TABLET ORAL at 08:12

## 2018-10-27 RX ADMIN — DOCUSATE SODIUM 100 MG: 100 CAPSULE, LIQUID FILLED ORAL at 20:22

## 2018-10-27 RX ADMIN — ENOXAPARIN SODIUM 30 MG: 30 INJECTION SUBCUTANEOUS at 08:11

## 2018-10-27 RX ADMIN — OXYCODONE HYDROCHLORIDE 10 MG: 5 TABLET ORAL at 16:04

## 2018-10-27 RX ADMIN — Medication 10 ML: at 20:19

## 2018-10-27 RX ADMIN — DOCUSATE SODIUM 100 MG: 100 CAPSULE, LIQUID FILLED ORAL at 08:12

## 2018-10-27 RX ADMIN — OXYCODONE HYDROCHLORIDE 10 MG: 5 TABLET ORAL at 22:18

## 2018-10-27 RX ADMIN — GABAPENTIN 100 MG: 100 CAPSULE ORAL at 08:12

## 2018-10-27 RX ADMIN — ACETAMINOPHEN 650 MG: 325 TABLET, FILM COATED ORAL at 22:18

## 2018-10-27 RX ADMIN — ACETAMINOPHEN 650 MG: 325 TABLET, FILM COATED ORAL at 01:35

## 2018-10-27 RX ADMIN — Medication 10 ML: at 08:11

## 2018-10-27 ASSESSMENT — PAIN SCALES - GENERAL
PAINLEVEL_OUTOF10: 0
PAINLEVEL_OUTOF10: 10
PAINLEVEL_OUTOF10: 6
PAINLEVEL_OUTOF10: 10
PAINLEVEL_OUTOF10: 0
PAINLEVEL_OUTOF10: 10
PAINLEVEL_OUTOF10: 8
PAINLEVEL_OUTOF10: 10
PAINLEVEL_OUTOF10: 0
PAINLEVEL_OUTOF10: 0
PAINLEVEL_OUTOF10: 7
PAINLEVEL_OUTOF10: 8
PAINLEVEL_OUTOF10: 10
PAINLEVEL_OUTOF10: 7
PAINLEVEL_OUTOF10: 0

## 2018-10-27 ASSESSMENT — PAIN DESCRIPTION - FREQUENCY
FREQUENCY: CONTINUOUS

## 2018-10-27 ASSESSMENT — PAIN DESCRIPTION - PAIN TYPE
TYPE: ACUTE PAIN

## 2018-10-27 ASSESSMENT — PAIN DESCRIPTION - ORIENTATION
ORIENTATION: LEFT

## 2018-10-27 ASSESSMENT — PAIN DESCRIPTION - LOCATION
LOCATION: RIB CAGE

## 2018-10-27 ASSESSMENT — PAIN DESCRIPTION - DESCRIPTORS: DESCRIPTORS: CONSTANT;SPASM;TENDER

## 2018-10-27 ASSESSMENT — PAIN DESCRIPTION - ONSET
ONSET: ON-GOING

## 2018-10-28 ENCOUNTER — APPOINTMENT (OUTPATIENT)
Dept: GENERAL RADIOLOGY | Age: 48
DRG: 200 | End: 2018-10-28
Payer: COMMERCIAL

## 2018-10-28 LAB
ALBUMIN SERPL-MCNC: 4 G/DL (ref 3.5–5.2)
ALP BLD-CCNC: 44 U/L (ref 35–104)
ALT SERPL-CCNC: 10 U/L (ref 0–32)
ANION GAP SERPL CALCULATED.3IONS-SCNC: 8 MMOL/L (ref 7–16)
AST SERPL-CCNC: 10 U/L (ref 0–31)
BASOPHILS ABSOLUTE: 0.04 E9/L (ref 0–0.2)
BASOPHILS RELATIVE PERCENT: 0.5 % (ref 0–2)
BILIRUB SERPL-MCNC: 0.2 MG/DL (ref 0–1.2)
BUN BLDV-MCNC: 14 MG/DL (ref 6–20)
CALCIUM SERPL-MCNC: 9 MG/DL (ref 8.6–10.2)
CHLORIDE BLD-SCNC: 103 MMOL/L (ref 98–107)
CO2: 31 MMOL/L (ref 22–29)
CREAT SERPL-MCNC: 0.8 MG/DL (ref 0.5–1)
EOSINOPHILS ABSOLUTE: 0.22 E9/L (ref 0.05–0.5)
EOSINOPHILS RELATIVE PERCENT: 2.7 % (ref 0–6)
GFR AFRICAN AMERICAN: >60
GFR NON-AFRICAN AMERICAN: >60 ML/MIN/1.73
GLUCOSE BLD-MCNC: 97 MG/DL (ref 74–109)
HCT VFR BLD CALC: 37.6 % (ref 34–48)
HEMOGLOBIN: 11.8 G/DL (ref 11.5–15.5)
IMMATURE GRANULOCYTES #: 0.02 E9/L
IMMATURE GRANULOCYTES %: 0.2 % (ref 0–5)
LYMPHOCYTES ABSOLUTE: 2.71 E9/L (ref 1.5–4)
LYMPHOCYTES RELATIVE PERCENT: 32.8 % (ref 20–42)
MCH RBC QN AUTO: 29.1 PG (ref 26–35)
MCHC RBC AUTO-ENTMCNC: 31.4 % (ref 32–34.5)
MCV RBC AUTO: 92.8 FL (ref 80–99.9)
MONOCYTES ABSOLUTE: 0.54 E9/L (ref 0.1–0.95)
MONOCYTES RELATIVE PERCENT: 6.5 % (ref 2–12)
NEUTROPHILS ABSOLUTE: 4.72 E9/L (ref 1.8–7.3)
NEUTROPHILS RELATIVE PERCENT: 57.3 % (ref 43–80)
PDW BLD-RTO: 13.1 FL (ref 11.5–15)
PLATELET # BLD: 260 E9/L (ref 130–450)
PMV BLD AUTO: 11.3 FL (ref 7–12)
POTASSIUM REFLEX MAGNESIUM: 4.5 MMOL/L (ref 3.5–5)
RBC # BLD: 4.05 E12/L (ref 3.5–5.5)
SODIUM BLD-SCNC: 142 MMOL/L (ref 132–146)
TOTAL PROTEIN: 6.8 G/DL (ref 6.4–8.3)
WBC # BLD: 8.3 E9/L (ref 4.5–11.5)

## 2018-10-28 PROCEDURE — 85025 COMPLETE CBC W/AUTO DIFF WBC: CPT

## 2018-10-28 PROCEDURE — 6360000002 HC RX W HCPCS: Performed by: STUDENT IN AN ORGANIZED HEALTH CARE EDUCATION/TRAINING PROGRAM

## 2018-10-28 PROCEDURE — 97530 THERAPEUTIC ACTIVITIES: CPT

## 2018-10-28 PROCEDURE — 2580000003 HC RX 258: Performed by: STUDENT IN AN ORGANIZED HEALTH CARE EDUCATION/TRAINING PROGRAM

## 2018-10-28 PROCEDURE — 6370000000 HC RX 637 (ALT 250 FOR IP): Performed by: STUDENT IN AN ORGANIZED HEALTH CARE EDUCATION/TRAINING PROGRAM

## 2018-10-28 PROCEDURE — 71045 X-RAY EXAM CHEST 1 VIEW: CPT

## 2018-10-28 PROCEDURE — 2700000000 HC OXYGEN THERAPY PER DAY

## 2018-10-28 PROCEDURE — 2060000000 HC ICU INTERMEDIATE R&B

## 2018-10-28 PROCEDURE — 80053 COMPREHEN METABOLIC PANEL: CPT

## 2018-10-28 PROCEDURE — 36415 COLL VENOUS BLD VENIPUNCTURE: CPT

## 2018-10-28 RX ORDER — OXYCODONE HYDROCHLORIDE 10 MG/1
10 TABLET ORAL EVERY 4 HOURS PRN
Status: DISCONTINUED | OUTPATIENT
Start: 2018-10-28 | End: 2018-10-29 | Stop reason: HOSPADM

## 2018-10-28 RX ORDER — KETOROLAC TROMETHAMINE 30 MG/ML
15 INJECTION, SOLUTION INTRAMUSCULAR; INTRAVENOUS EVERY 6 HOURS PRN
Status: DISCONTINUED | OUTPATIENT
Start: 2018-10-28 | End: 2018-10-28

## 2018-10-28 RX ORDER — OXYCODONE HYDROCHLORIDE 15 MG/1
15 TABLET ORAL EVERY 4 HOURS PRN
Status: DISCONTINUED | OUTPATIENT
Start: 2018-10-28 | End: 2018-10-29 | Stop reason: HOSPADM

## 2018-10-28 RX ORDER — POLYETHYLENE GLYCOL 3350 17 G/17G
17 POWDER, FOR SOLUTION ORAL DAILY
Status: DISCONTINUED | OUTPATIENT
Start: 2018-10-28 | End: 2018-10-29 | Stop reason: HOSPADM

## 2018-10-28 RX ORDER — KETOROLAC TROMETHAMINE 30 MG/ML
15 INJECTION, SOLUTION INTRAMUSCULAR; INTRAVENOUS EVERY 6 HOURS
Status: DISCONTINUED | OUTPATIENT
Start: 2018-10-28 | End: 2018-10-29 | Stop reason: HOSPADM

## 2018-10-28 RX ORDER — SENNA AND DOCUSATE SODIUM 50; 8.6 MG/1; MG/1
2 TABLET, FILM COATED ORAL 2 TIMES DAILY
Status: DISCONTINUED | OUTPATIENT
Start: 2018-10-28 | End: 2018-10-29 | Stop reason: HOSPADM

## 2018-10-28 RX ORDER — MORPHINE SULFATE 2 MG/ML
2 INJECTION, SOLUTION INTRAMUSCULAR; INTRAVENOUS
Status: DISCONTINUED | OUTPATIENT
Start: 2018-10-28 | End: 2018-10-29

## 2018-10-28 RX ADMIN — OXYCODONE HYDROCHLORIDE 10 MG: 10 TABLET ORAL at 22:45

## 2018-10-28 RX ADMIN — ACETAMINOPHEN 650 MG: 325 TABLET, FILM COATED ORAL at 21:44

## 2018-10-28 RX ADMIN — SENNOSIDES AND DOCUSATE SODIUM 2 TABLET: 8.6; 5 TABLET ORAL at 10:08

## 2018-10-28 RX ADMIN — POLYETHYLENE GLYCOL 3350 17 G: 17 POWDER, FOR SOLUTION ORAL at 10:07

## 2018-10-28 RX ADMIN — Medication 1 MG: at 21:13

## 2018-10-28 RX ADMIN — ENOXAPARIN SODIUM 30 MG: 30 INJECTION SUBCUTANEOUS at 21:12

## 2018-10-28 RX ADMIN — ACETAMINOPHEN 650 MG: 325 TABLET, FILM COATED ORAL at 13:58

## 2018-10-28 RX ADMIN — MAGNESIUM HYDROXIDE 30 ML: 400 SUSPENSION ORAL at 10:08

## 2018-10-28 RX ADMIN — MORPHINE SULFATE 2 MG: 2 INJECTION, SOLUTION INTRAMUSCULAR; INTRAVENOUS at 05:30

## 2018-10-28 RX ADMIN — Medication 10 ML: at 11:18

## 2018-10-28 RX ADMIN — ACETAMINOPHEN 650 MG: 325 TABLET, FILM COATED ORAL at 09:54

## 2018-10-28 RX ADMIN — MULTIPLE VITAMINS W/ MINERALS TAB 1 TABLET: TAB at 08:24

## 2018-10-28 RX ADMIN — Medication 10 ML: at 13:08

## 2018-10-28 RX ADMIN — KETOROLAC TROMETHAMINE 15 MG: 30 INJECTION, SOLUTION INTRAMUSCULAR at 22:44

## 2018-10-28 RX ADMIN — OXYCODONE HYDROCHLORIDE 10 MG: 10 TABLET ORAL at 10:08

## 2018-10-28 RX ADMIN — GABAPENTIN 100 MG: 100 CAPSULE ORAL at 18:13

## 2018-10-28 RX ADMIN — KETOROLAC TROMETHAMINE 15 MG: 30 INJECTION, SOLUTION INTRAMUSCULAR at 11:18

## 2018-10-28 RX ADMIN — MORPHINE SULFATE 2 MG: 2 INJECTION, SOLUTION INTRAMUSCULAR; INTRAVENOUS at 13:08

## 2018-10-28 RX ADMIN — Medication 10 ML: at 21:13

## 2018-10-28 RX ADMIN — DULOXETINE HYDROCHLORIDE 20 MG: 20 CAPSULE, DELAYED RELEASE ORAL at 08:24

## 2018-10-28 RX ADMIN — METHOCARBAMOL 750 MG: 750 TABLET ORAL at 08:24

## 2018-10-28 RX ADMIN — ACETAMINOPHEN 650 MG: 325 TABLET, FILM COATED ORAL at 02:40

## 2018-10-28 RX ADMIN — ACETAMINOPHEN 650 MG: 325 TABLET, FILM COATED ORAL at 17:22

## 2018-10-28 RX ADMIN — SENNOSIDES AND DOCUSATE SODIUM 2 TABLET: 8.6; 5 TABLET ORAL at 21:14

## 2018-10-28 RX ADMIN — OXYCODONE HYDROCHLORIDE 10 MG: 10 TABLET ORAL at 14:13

## 2018-10-28 RX ADMIN — DIPHENHYDRAMINE HCL 50 MG: 25 TABLET ORAL at 21:13

## 2018-10-28 RX ADMIN — GABAPENTIN 100 MG: 100 CAPSULE ORAL at 08:24

## 2018-10-28 RX ADMIN — DOCUSATE SODIUM 100 MG: 100 CAPSULE, LIQUID FILLED ORAL at 21:15

## 2018-10-28 RX ADMIN — OXYCODONE HYDROCHLORIDE 10 MG: 5 TABLET ORAL at 02:41

## 2018-10-28 RX ADMIN — MORPHINE SULFATE 4 MG: 4 INJECTION INTRAVENOUS at 08:31

## 2018-10-28 RX ADMIN — LETROZOLE 2.5 MG: 2.5 TABLET ORAL at 08:24

## 2018-10-28 RX ADMIN — ACETAMINOPHEN 650 MG: 325 TABLET, FILM COATED ORAL at 05:30

## 2018-10-28 RX ADMIN — METHOCARBAMOL 750 MG: 750 TABLET ORAL at 21:13

## 2018-10-28 RX ADMIN — ENOXAPARIN SODIUM 30 MG: 30 INJECTION SUBCUTANEOUS at 08:32

## 2018-10-28 RX ADMIN — METHOCARBAMOL 750 MG: 750 TABLET ORAL at 13:09

## 2018-10-28 RX ADMIN — METHOCARBAMOL 750 MG: 750 TABLET ORAL at 17:23

## 2018-10-28 RX ADMIN — Medication 10 ML: at 08:24

## 2018-10-28 RX ADMIN — KETOROLAC TROMETHAMINE 15 MG: 30 INJECTION, SOLUTION INTRAMUSCULAR at 16:42

## 2018-10-28 RX ADMIN — DOCUSATE SODIUM 100 MG: 100 CAPSULE, LIQUID FILLED ORAL at 08:25

## 2018-10-28 ASSESSMENT — PAIN DESCRIPTION - PAIN TYPE
TYPE: ACUTE PAIN

## 2018-10-28 ASSESSMENT — PAIN DESCRIPTION - LOCATION
LOCATION: RIB CAGE

## 2018-10-28 ASSESSMENT — PAIN SCALES - GENERAL
PAINLEVEL_OUTOF10: 10
PAINLEVEL_OUTOF10: 0
PAINLEVEL_OUTOF10: 6
PAINLEVEL_OUTOF10: 5
PAINLEVEL_OUTOF10: 10
PAINLEVEL_OUTOF10: 10
PAINLEVEL_OUTOF10: 4
PAINLEVEL_OUTOF10: 10
PAINLEVEL_OUTOF10: 10
PAINLEVEL_OUTOF10: 8
PAINLEVEL_OUTOF10: 10
PAINLEVEL_OUTOF10: 10
PAINLEVEL_OUTOF10: 9
PAINLEVEL_OUTOF10: 6
PAINLEVEL_OUTOF10: 0
PAINLEVEL_OUTOF10: 5
PAINLEVEL_OUTOF10: 0
PAINLEVEL_OUTOF10: 8

## 2018-10-28 ASSESSMENT — PAIN DESCRIPTION - ORIENTATION
ORIENTATION: LEFT

## 2018-10-28 ASSESSMENT — PAIN DESCRIPTION - DESCRIPTORS: DESCRIPTORS: ACHING;CONSTANT;DISCOMFORT;SORE;TENDER

## 2018-10-28 ASSESSMENT — PAIN DESCRIPTION - FREQUENCY
FREQUENCY: CONTINUOUS

## 2018-10-28 ASSESSMENT — PAIN DESCRIPTION - ONSET
ONSET: ON-GOING

## 2018-10-28 ASSESSMENT — PAIN DESCRIPTION - PROGRESSION
CLINICAL_PROGRESSION: GRADUALLY WORSENING

## 2018-10-28 NOTE — PROGRESS NOTES
Daily Trauma Progress Note 10/28/2018      Admit Date: 10/25/2018      Mechanism of Injury: Fall distance 3 feet    INJURIES:   Patient Active Problem List   Diagnosis    Carcinoma in situ of breast    S/P bilateral mastectomy    [facility], [facility] [address]    Closed right humeral fracture    Breast cancer ([facility] [address]    Personal history of breast cancer    Retained orthopedic hardware    Aromatase inhibitor use    Closed fracture of four ribs of left side    Multiple closed fractures of ribs of left side    Pneumothorax, traumatic       PREVIOUS 24 HOUR EVENTS: no acute events    Subjective:     Resting well this AM, continues to have pain, tolerating light diet with no nausea, denies BM, SMI poor at 250 this AM    Objective:     Patient Vitals for the past 8 hrs:   BP Temp Temp src Pulse Resp SpO2   10/28/18 0821 (!) 102/54 97.1 °F (36.2 °C) Temporal 78 16 99 %     I/O last 3 completed shifts: In: 0 [P.O.:600; I.V.:50]  Out: -   I/O this shift:  In: 10 [I.V.:10]  Out: -     PHYSICAL:  Pain Description: moderate  GCS:    4 - Opens eyes on own   6 - Follows simple motor commands  5 - Alert and oriented    Pupil size:  Left 4 mm    Right 4 mm    Wiggles fingers: Left Yes Right Yes    Hand grasp:   Left normal       Right normal    Wiggles toes: Left Yes    Right Yes    Plantar flexion: Left normal     Right normal    GENERAL:  Laying in bed, awake, alert, cooperative, no apparent distress  LUNGS:  Shallow breathing noted, on 3 L NC  CARDIOVASCULAR:  RR  ABDOMEN:  Soft, non-tender, non-distended      CONSULTS: none    PROCEDURES: none    Assessment:     Active Problems:    Closed fracture of four ribs of left side    Multiple closed fractures of ribs of left side    Pneumothorax, traumatic  Resolved Problems:    * No resolved hospital problems. *      Plan:     Neuro:  No acute issues. GCS 15. Pain control. Home Cymbalta, topamax. CV: No acute issues.     Pulm: L rib 4-7 fractures with small L PTX--now

## 2018-10-29 ENCOUNTER — TELEPHONE (OUTPATIENT)
Dept: SURGERY | Age: 48
End: 2018-10-29

## 2018-10-29 VITALS
HEIGHT: 62 IN | WEIGHT: 175 LBS | TEMPERATURE: 97 F | RESPIRATION RATE: 18 BRPM | SYSTOLIC BLOOD PRESSURE: 123 MMHG | BODY MASS INDEX: 32.2 KG/M2 | DIASTOLIC BLOOD PRESSURE: 59 MMHG | OXYGEN SATURATION: 96 % | HEART RATE: 80 BPM
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
## 2018-10-29 PROCEDURE — 2580000003 HC RX 258: Performed by: STUDENT IN AN ORGANIZED HEALTH CARE EDUCATION/TRAINING PROGRAM

## 2018-10-29 PROCEDURE — 6360000002 HC RX W HCPCS: Performed by: STUDENT IN AN ORGANIZED HEALTH CARE EDUCATION/TRAINING PROGRAM

## 2018-10-29 PROCEDURE — 6370000000 HC RX 637 (ALT 250 FOR IP): Performed by: STUDENT IN AN ORGANIZED HEALTH CARE EDUCATION/TRAINING PROGRAM

## 2018-10-29 PROCEDURE — 97535 SELF CARE MNGMENT TRAINING: CPT

## 2018-10-29 RX ORDER — OXYCODONE HYDROCHLORIDE 10 MG/1
10 TABLET ORAL EVERY 6 HOURS PRN
Qty: 26 TABLET | Refills: 0 | Status: SHIPPED | OUTPATIENT
Start: 2018-10-29 | End: 2018-11-05

## 2018-10-29 RX ORDER — METHOCARBAMOL 750 MG/1
750 TABLET, FILM COATED ORAL 4 TIMES DAILY
Qty: 40 TABLET | Refills: 0 | Status: SHIPPED | OUTPATIENT
Start: 2018-10-29 | End: 2018-11-06 | Stop reason: SDUPTHER

## 2018-10-29 RX ORDER — LIDOCAINE 50 MG/G
1 PATCH TOPICAL DAILY
Qty: 7 PATCH | Refills: 0 | Status: SHIPPED | OUTPATIENT
Start: 2018-10-30 | End: 2018-11-06 | Stop reason: SDUPTHER

## 2018-10-29 RX ADMIN — POLYETHYLENE GLYCOL 3350 17 G: 17 POWDER, FOR SOLUTION ORAL at 11:20

## 2018-10-29 RX ADMIN — DOCUSATE SODIUM 100 MG: 100 CAPSULE, LIQUID FILLED ORAL at 11:21

## 2018-10-29 RX ADMIN — ENOXAPARIN SODIUM 30 MG: 30 INJECTION SUBCUTANEOUS at 11:22

## 2018-10-29 RX ADMIN — MULTIPLE VITAMINS W/ MINERALS TAB 1 TABLET: TAB at 11:20

## 2018-10-29 RX ADMIN — SENNOSIDES AND DOCUSATE SODIUM 2 TABLET: 8.6; 5 TABLET ORAL at 11:21

## 2018-10-29 RX ADMIN — DULOXETINE HYDROCHLORIDE 20 MG: 20 CAPSULE, DELAYED RELEASE ORAL at 11:20

## 2018-10-29 RX ADMIN — METHOCARBAMOL 750 MG: 750 TABLET ORAL at 11:20

## 2018-10-29 RX ADMIN — ACETAMINOPHEN 650 MG: 325 TABLET, FILM COATED ORAL at 05:35

## 2018-10-29 RX ADMIN — Medication 10 ML: at 11:21

## 2018-10-29 RX ADMIN — METHOCARBAMOL 750 MG: 750 TABLET ORAL at 15:00

## 2018-10-29 RX ADMIN — ACETAMINOPHEN 650 MG: 325 TABLET, FILM COATED ORAL at 15:01

## 2018-10-29 RX ADMIN — GABAPENTIN 100 MG: 100 CAPSULE ORAL at 11:20

## 2018-10-29 RX ADMIN — KETOROLAC TROMETHAMINE 15 MG: 30 INJECTION, SOLUTION INTRAMUSCULAR at 11:21

## 2018-10-29 RX ADMIN — LETROZOLE 2.5 MG: 2.5 TABLET ORAL at 15:00

## 2018-10-29 RX ADMIN — KETOROLAC TROMETHAMINE 15 MG: 30 INJECTION, SOLUTION INTRAMUSCULAR at 05:30

## 2018-10-29 ASSESSMENT — PAIN SCALES - GENERAL
PAINLEVEL_OUTOF10: 6
PAINLEVEL_OUTOF10: 0
PAINLEVEL_OUTOF10: 2
PAINLEVEL_OUTOF10: 5
PAINLEVEL_OUTOF10: 0
PAINLEVEL_OUTOF10: 3

## 2018-10-29 NOTE — PROGRESS NOTES
Physical Therapy  Facility/Department: [address] PICU  NAME: Gibran Telles  : 1970  MRN: [id number]    Date of Service: 10/29/2018    Evaluating Therapist: Nithin Dey PT, DPT     Room #: 6919V  DIAGNOSIS: Closed fracture L lateral ribs 4, 5, 6, 7  PRECAUTIONS: Falls, Lateral rib bracing, O2  S/p fall off table     Social:  Pt lives with , daughter and 3 dogs in a 2 floor plan with 4 step(s) and no rail(s) to enter through the garage, 2 steps and no rail at front door. Bedroom/bathroom on 2nd floor with flight and 1 rail. Half bath available on 1st floor. Patient reported having a recliner on the first floor to use if needed. Patient has another daughter in college. Prior to admission pt walked with no AD. Independent. 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 Patient also finishing her Master's Degree.                Initial Evaluation  Date: 10/26/18 Treatment  Date: 10/28/18   Short Term/ Long Term   Goals   Was pt agreeable to Eval/treatment? Yes  yes     Does pt have pain? Reported 10/10 L rib and shoulder pain. Nurse aware and medication already given.  5/10   Rib pain left      Bed Mobility  Rolling: SBA  Supine to sit: Min A  Sit to supine: NT  Scooting: Min A    NT Independent   Transfers Sit to stand: SBA  Stand to sit: SBA  Stand pivot: SBA Sit to stand: independent  Stand to sit: independent  Stand pivot: independent  Independent   Ambulation    250 feet with no AD with Supervision  300 feet with no AD with independent  >400 feet with no AD Independently   Stair negotiation: ascended and descended  NT 10 steps 1 rail   Mod I 12 steps with 1 rail with Mod I   AM-PAC Score         Pt is alert and oriented x 3  Balance: Supervision    Pt performed therapeutic exercise of the following: Function    Patient education  Pt was educated on importance of  safety    Patient response to education:   Pt verbalized understanding Pt demonstrated skill Pt requires further

## 2018-11-06 ENCOUNTER — OFFICE VISIT (OUTPATIENT)
Dept: SURGERY | Age: 48
End: 2018-11-06
Payer: COMMERCIAL

## 2018-11-06 VITALS
OXYGEN SATURATION: 95 % | RESPIRATION RATE: 17 BRPM | BODY MASS INDEX: 33.13 KG/M2 | SYSTOLIC BLOOD PRESSURE: 116 MMHG | WEIGHT: 180 LBS | HEIGHT: 62 IN | DIASTOLIC BLOOD PRESSURE: 73 MMHG | HEART RATE: 86 BPM

## 2018-11-06 DIAGNOSIS — S22.42XA CLOSED FRACTURE OF FOUR RIBS OF LEFT SIDE, INITIAL ENCOUNTER: ICD-10-CM

## 2018-11-06 DIAGNOSIS — S27.0XXD TRAUMATIC PNEUMOTHORAX, SUBSEQUENT ENCOUNTER: ICD-10-CM

## 2018-11-06 DIAGNOSIS — C50.012 MALIGNANT NEOPLASM OF NIPPLE OF LEFT BREAST IN FEMALE, UNSPECIFIED ESTROGEN RECEPTOR STATUS (HCC): Primary | Chronic | ICD-10-CM
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
## 2018-11-06 DIAGNOSIS — S22.42XD CLOSED FRACTURE OF FOUR RIBS OF LEFT SIDE WITH ROUTINE HEALING, SUBSEQUENT ENCOUNTER: ICD-10-CM

## 2018-11-06 PROCEDURE — 99212 OFFICE O/P EST SF 10 MIN: CPT | Performed by: NURSE PRACTITIONER

## 2018-11-06 PROCEDURE — 99213 OFFICE O/P EST LOW 20 MIN: CPT | Performed by: NURSE PRACTITIONER

## 2018-11-06 RX ORDER — LETROZOLE 2.5 MG/1
2.5 TABLET, FILM COATED ORAL DAILY
Qty: 90 TABLET | Refills: 3 | Status: SHIPPED | OUTPATIENT
Start: 2018-11-06 | End: 2019-05-10

## 2018-11-06 RX ORDER — LIDOCAINE 50 MG/G
1 PATCH TOPICAL DAILY
Qty: 14 PATCH | Refills: 0 | Status: SHIPPED | OUTPATIENT
Start: 2018-11-06 | End: 2018-11-20

## 2018-11-06 RX ORDER — LIDOCAINE 50 MG/G
1 PATCH TOPICAL DAILY
Qty: 14 PATCH | Refills: 0 | Status: SHIPPED | OUTPATIENT
Start: 2018-11-06 | End: 2018-11-06 | Stop reason: SDUPTHER

## 2018-11-06 RX ORDER — DIPHENHYDRAMINE HCL 25 MG
25 CAPSULE ORAL EVERY 6 HOURS PRN
COMMUNITY
End: 2019-11-08

## 2018-11-06 RX ORDER — METHOCARBAMOL 750 MG/1
1500 TABLET, FILM COATED ORAL 4 TIMES DAILY
Qty: 112 TABLET | Refills: 0 | Status: SHIPPED | OUTPATIENT
Start: 2018-11-06 | End: 2018-11-20

## 2018-11-06 RX ORDER — LETROZOLE 2.5 MG/1
2.5 TABLET, FILM COATED ORAL DAILY
Qty: 90 TABLET | Refills: 3 | Status: SHIPPED | OUTPATIENT
Start: 2018-11-06 | End: 2019-11-08

## 2018-11-06 NOTE — PROGRESS NOTES
Compression Sleeves Left arm swelling-personal history left breast cancer-mastectomy  Patient taking differently: Indications: only during excersise Left arm swelling-personal history left breast cancer-mastectomy 10/28/15  Yes LAURITA Jorge CNP   ibuprofen (ADVIL;MOTRIN) 400 MG tablet Take 400 mg by mouth every 6 hours as needed for Pain LD 2/21/16 per surgeon   Yes Historical Provider, MD   Multiple Vitamins-Minerals (THERAPEUTIC MULTIVITAMIN-MINERALS) tablet Take 1 tablet by mouth nightly LD 2/23/16   Yes Historical Provider, MD   letrozole [facility]) 2.5 MG tablet Take 1 tablet by mouth daily 11/6/18   LAURITA Palomo CNP   diclofenac (VOLTAREN) 75 MG EC tablet Take 1 tablet by mouth 2 times daily 10/25/18   Zana Sales MD   topiramate (TOPAMAX) 25 MG tablet Take 25 mg by mouth nightly 5/25/17   Historical Provider, MD   acetaminophen (TYLENOL) 325 MG tablet Take 650 mg by mouth every 6 hours as needed for Pain    Historical Provider, MD   Calcium Citrate (CITRACAL PO) Take 1 tablet by mouth daily LD 2/23/16    Historical Provider, MD          CC:  Trauma follow up Fall from Chair    Patient presents to the clinic today for follow up after falling from a chair in which she sustained multiple fractured  And a small pneumo that did not require chest tube. Since discharge patient states that she stopped taking pain medication as she would rather take non-narcotic medication. She denies any SOB at rest, fever or chills. Her appetite is \"OK\" and just started sleeping in be yesterday. Has been up and walking around the house without difficulty. Has been using [facility] several ties a day, stating she had been reaching 2000       Physical Exam   Physical Exam   Constitutional: She is oriented to person, place, and time. She appears well-developed. HENT:   Head: Normocephalic. Eyes: Pupils are equal, round, and reactive to light. Neck: Neck supple. Cardiovascular: Normal rate.

## 2018-11-20 ENCOUNTER — OFFICE VISIT (OUTPATIENT)
Dept: SURGERY | Age: 48
End: 2018-11-20
Payer: COMMERCIAL

## 2018-11-20 VITALS
WEIGHT: 179 LBS | DIASTOLIC BLOOD PRESSURE: 78 MMHG | RESPIRATION RATE: 18 BRPM | SYSTOLIC BLOOD PRESSURE: 120 MMHG | OXYGEN SATURATION: 97 % | TEMPERATURE: 98.4 F | HEIGHT: 62 IN | BODY MASS INDEX: 32.94 KG/M2 | HEART RATE: 89 BPM

## 2018-11-20 DIAGNOSIS — S22.42XD CLOSED FRACTURE OF MULTIPLE RIBS OF LEFT SIDE WITH ROUTINE HEALING, SUBSEQUENT ENCOUNTER: ICD-10-CM

## 2018-11-20 DIAGNOSIS — Z09 FOLLOW UP: Primary | ICD-10-CM

## 2018-11-20 PROCEDURE — 99212 OFFICE O/P EST SF 10 MIN: CPT | Performed by: NURSE PRACTITIONER

## 2018-11-20 RX ORDER — LIDOCAINE 50 MG/G
1 PATCH TOPICAL DAILY
Qty: 30 PATCH | Refills: 0 | Status: SHIPPED | OUTPATIENT
Start: 2018-11-20 | End: 2019-05-10

## 2018-11-20 NOTE — PATIENT INSTRUCTIONS
[facility]) [facility]  Additional Discharge Instructions    Call [phone] for any questions/concerns     Please follow the instructions checked below:      ACTIVITY INSTRUCTIONS:  [x]Increase activity as tolerated    [x]Elevate affected/operative limb   [x]No heavy lifting or strenuous activity     []No driving while taking pain medication  []Cough and deep breath 4 - 6 times a day   [x]Incentive Spirometer 4 - 6 times a day   []Other     WOUND/DRESSING INSTRUCTIONS:  [x]May shower      []No sitting in bath tub, hot tub or swimming. []Ice to areas of pain for first 24 hours. Heat to areas of pain after that. []Wash area with antibacterial soap & water. Rinse well. Pat dry with clean towel. Apply thin layer of Bacitracin to affected area. Keep wounds clean and dry. []Other         MEDICATION INSTRUCTIONS:  [x]Take medication as prescribed. []When taking pain medications, you may experience dizziness or drowsiness. Do not drink alcohol or drive when taking these medications. []You may take Ibuprofen (over the counter) as per directions for mild pain. []Do not take any other acetaminophen (Tylenol) products while taking your pain medication. []You may experience constipation while taking pain medication - You may take over the counter stool softeners: docuscate (Colace) or sennosides S (Senokot - S).      WORK:  [x]You may return to work with gradual increase in hours per day      Call physician for any of the following or for questions/concerns:   · Fever over 101° F    · Redness, swelling, hardness or warmth at the wound site (s)  · Unrelieved nausea/vomiting    · Foul smelling or cloudy drainage at the wound site (s)   · Unrelieved pain or increase in pain     · Increase in shortness of breath

## 2018-11-20 NOTE — PROGRESS NOTES
Trauma Clinic Progress Note   11/20/2018     Date of injury: 10/25/18    RAMSES/Injuries:   Patient Active Problem List   Diagnosis Code    Carcinoma in situ of breast D05.90    S/P bilateral mastectomy Z90.13    Breast cancer, left breast ([facility] [address] C50.912    Closed right humeral fracture S42.301A    Breast cancer ([facility] [address] C50.919    Personal history of breast cancer Z85.3    Retained orthopedic hardware Z96.9    Aromatase inhibitor use Z79.811    Closed fracture of four ribs of left side S22.42XA    Multiple closed fractures of ribs of left side S22.42XA    Pneumothorax, traumatic S27. Sima Drum    Fall W19. XXXA       Surgeries:   Past Surgical History:   Procedure Laterality Date    BREAST SURGERY Bilateral 8/2014    Mastectomy    BREAST SURGERY Bilateral 12/2014    Implant    EYE SURGERY Bilateral     lasik    HYSTERECTOMY      MASTECTOMY Bilateral 8/19/2014    INSERTION OF TISSUE EXPANDERS    SHOULDER SURGERY Right 1/2015       Vital signs:    /78 (Site: Left Upper Arm, Position: Sitting, Cuff Size: Small Adult)   Pulse 89   Temp 98.4 °F (36.9 °C) (Oral)   Resp 18   Ht 5' 2\" (1.575 m)   Wt 179 lb (81.2 kg)   SpO2 97%   BMI 32.74 kg/m²     Medications:    Prior to Admission medications    Medication Sig Start Date End Date Taking?  Authorizing Provider   diphenhydrAMINE (BENADRYL) 25 MG capsule Take 25 mg by mouth every 6 hours as needed for Itching   Yes Historical Provider, MD   letrozole [facility]) 2.5 MG tablet Take 1 tablet by mouth daily 11/6/18  Yes Amy Valentino Clutter, APRN - CNP   methocarbamol (ROBAXIN) 750 MG tablet Take 2 tablets by mouth 4 times daily for 14 days 11/6/18 11/20/18 Yes LAURITA Reese CNP   lidocaine (LIDODERM) 5 % Place 1 patch onto the skin daily for 14 doses 12 hours on, 12 hours off. 11/6/18 11/20/18 Yes LAURITA Reese CNP   letrozole [facility]) 2.5 MG tablet Take 1 tablet by mouth daily 11/6/18  Yes Amy Valentino Clutter, APRN - CNP   diclofenac (VOLTAREN) 75 MG EC tablet Take 1
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
## 2018-12-20 ENCOUNTER — HOSPITAL ENCOUNTER (OUTPATIENT)
Dept: GENERAL RADIOLOGY | Age: 48
Discharge: HOME | End: 2018-12-22
Payer: COMMERCIAL

## 2018-12-20 DIAGNOSIS — C50.012 MALIGNANT NEOPLASM OF NIPPLE OF LEFT BREAST IN FEMALE, UNSPECIFIED ESTROGEN RECEPTOR STATUS (HCC): Chronic | ICD-10-CM

## 2018-12-20 PROCEDURE — 77080 DXA BONE DENSITY AXIAL: CPT

## 2018-12-28 ENCOUNTER — TELEPHONE (OUTPATIENT)
Dept: ONCOLOGY | Age: 48
End: 2018-12-28

## 2018-12-28 DIAGNOSIS — C50.012 MALIGNANT NEOPLASM OF NIPPLE OF LEFT BREAST IN FEMALE, UNSPECIFIED ESTROGEN RECEPTOR STATUS (HCC): Primary | ICD-10-CM

## 2019-01-04 DIAGNOSIS — C50.012 MALIGNANT NEOPLASM OF NIPPLE OF LEFT BREAST IN FEMALE, UNSPECIFIED ESTROGEN RECEPTOR STATUS (HCC): Primary | Chronic | ICD-10-CM

## 2019-01-07 ENCOUNTER — HOSPITAL ENCOUNTER (OUTPATIENT)
Dept: INFUSION THERAPY | Age: 49
Discharge: HOME | End: 2019-01-07
Payer: COMMERCIAL

## 2019-01-07 ENCOUNTER — OFFICE VISIT (OUTPATIENT)
Dept: ONCOLOGY | Age: 49
End: 2019-01-07
Payer: COMMERCIAL

## 2019-01-07 VITALS
HEIGHT: 62 IN | TEMPERATURE: 98.5 F | RESPIRATION RATE: 20 BRPM | HEART RATE: 85 BPM | SYSTOLIC BLOOD PRESSURE: 123 MMHG | DIASTOLIC BLOOD PRESSURE: 61 MMHG | WEIGHT: 181.32 LBS | BODY MASS INDEX: 33.37 KG/M2

## 2019-01-07 DIAGNOSIS — C50.012 MALIGNANT NEOPLASM OF NIPPLE OF LEFT BREAST IN FEMALE, UNSPECIFIED ESTROGEN RECEPTOR STATUS (HCC): Chronic | ICD-10-CM

## 2019-01-07 DIAGNOSIS — Z85.3 PERSONAL HISTORY OF BREAST CANCER: Primary | ICD-10-CM

## 2019-01-07 LAB
ALBUMIN SERPL-MCNC: 4.7 G/DL (ref 3.5–5.2)
ALP BLD-CCNC: 109 U/L (ref 35–104)
ALT SERPL-CCNC: 14 U/L (ref 0–32)
ANION GAP SERPL CALCULATED.3IONS-SCNC: 14 MMOL/L (ref 7–16)
AST SERPL-CCNC: 12 U/L (ref 0–31)
BASOPHILS ABSOLUTE: 0.06 E9/L (ref 0–0.2)
BASOPHILS RELATIVE PERCENT: 0.7 % (ref 0–2)
BILIRUB SERPL-MCNC: <0.2 MG/DL (ref 0–1.2)
BUN BLDV-MCNC: 14 MG/DL (ref 6–20)
CALCIUM SERPL-MCNC: 10 MG/DL (ref 8.6–10.2)
CHLORIDE BLD-SCNC: 98 MMOL/L (ref 98–107)
CO2: 27 MMOL/L (ref 22–29)
CREAT SERPL-MCNC: 0.9 MG/DL (ref 0.5–1)
EOSINOPHILS ABSOLUTE: 0.31 E9/L (ref 0.05–0.5)
EOSINOPHILS RELATIVE PERCENT: 3.7 % (ref 0–6)
GFR AFRICAN AMERICAN: >60
GFR NON-AFRICAN AMERICAN: >60 ML/MIN/1.73
GLUCOSE BLD-MCNC: 101 MG/DL (ref 74–99)
HCT VFR BLD CALC: 38.9 % (ref 34–48)
HEMOGLOBIN: 12.7 G/DL (ref 11.5–15.5)
IMMATURE GRANULOCYTES #: 0.02 E9/L
IMMATURE GRANULOCYTES %: 0.2 % (ref 0–5)
LYMPHOCYTES ABSOLUTE: 2.6 E9/L (ref 1.5–4)
LYMPHOCYTES RELATIVE PERCENT: 31 % (ref 20–42)
MCH RBC QN AUTO: 29.5 PG (ref 26–35)
MCHC RBC AUTO-ENTMCNC: 32.6 % (ref 32–34.5)
MCV RBC AUTO: 90.5 FL (ref 80–99.9)
MONOCYTES ABSOLUTE: 0.43 E9/L (ref 0.1–0.95)
MONOCYTES RELATIVE PERCENT: 5.1 % (ref 2–12)
NEUTROPHILS ABSOLUTE: 4.96 E9/L (ref 1.8–7.3)
NEUTROPHILS RELATIVE PERCENT: 59.3 % (ref 43–80)
PDW BLD-RTO: 13.2 FL (ref 11.5–15)
PLATELET # BLD: 307 E9/L (ref 130–450)
PMV BLD AUTO: 11.4 FL (ref 7–12)
POTASSIUM SERPL-SCNC: 4.4 MMOL/L (ref 3.5–5)
RBC # BLD: 4.3 E12/L (ref 3.5–5.5)
SODIUM BLD-SCNC: 139 MMOL/L (ref 132–146)
TOTAL PROTEIN: 7.5 G/DL (ref 6.4–8.3)
WBC # BLD: 8.4 E9/L (ref 4.5–11.5)

## 2019-01-07 PROCEDURE — 99214 OFFICE O/P EST MOD 30 MIN: CPT | Performed by: INTERNAL MEDICINE

## 2019-01-07 PROCEDURE — G8417 CALC BMI ABV UP PARAM F/U: HCPCS | Performed by: INTERNAL MEDICINE

## 2019-01-07 PROCEDURE — 80053 COMPREHEN METABOLIC PANEL: CPT

## 2019-01-07 PROCEDURE — G8427 DOCREV CUR MEDS BY ELIG CLIN: HCPCS | Performed by: INTERNAL MEDICINE

## 2019-01-07 PROCEDURE — 36415 COLL VENOUS BLD VENIPUNCTURE: CPT

## 2019-01-07 PROCEDURE — 99213 OFFICE O/P EST LOW 20 MIN: CPT | Performed by: INTERNAL MEDICINE

## 2019-01-07 PROCEDURE — G8484 FLU IMMUNIZE NO ADMIN: HCPCS | Performed by: INTERNAL MEDICINE

## 2019-01-07 PROCEDURE — 1036F TOBACCO NON-USER: CPT | Performed by: INTERNAL MEDICINE

## 2019-01-07 PROCEDURE — 85025 COMPLETE CBC W/AUTO DIFF WBC: CPT

## 2019-01-07 RX ORDER — METHYLPREDNISOLONE 4 MG/1
TABLET ORAL
Qty: 1 KIT | Refills: 0 | Status: SHIPPED | OUTPATIENT
Start: 2019-01-07 | End: 2019-01-13

## 2019-01-08 ENCOUNTER — TELEPHONE (OUTPATIENT)
Dept: ONCOLOGY | Age: 49
End: 2019-01-08

## 2019-01-08 ENCOUNTER — HOSPITAL ENCOUNTER (OUTPATIENT)
Dept: INFUSION THERAPY | Age: 49
Discharge: HOME | End: 2019-01-08
Payer: COMMERCIAL

## 2019-01-08 DIAGNOSIS — Z17.0 MALIGNANT NEOPLASM OF LEFT BREAST IN FEMALE, ESTROGEN RECEPTOR POSITIVE, UNSPECIFIED SITE OF BREAST (HCC): ICD-10-CM

## 2019-01-08 DIAGNOSIS — C50.912 MALIGNANT NEOPLASM OF LEFT BREAST IN FEMALE, ESTROGEN RECEPTOR POSITIVE, UNSPECIFIED SITE OF BREAST (HCC): ICD-10-CM

## 2019-01-08 PROCEDURE — 6360000002 HC RX W HCPCS: Performed by: INTERNAL MEDICINE

## 2019-01-08 PROCEDURE — 96372 THER/PROPH/DIAG INJ SC/IM: CPT

## 2019-01-08 RX ADMIN — DENOSUMAB 60 MG: 60 INJECTION SUBCUTANEOUS at 15:21

## 2019-01-09 ENCOUNTER — TELEPHONE (OUTPATIENT)
Dept: ONCOLOGY | Age: 49
End: 2019-01-09

## 2019-02-04 NOTE — PATIENT INSTRUCTIONS
[facility]) [facility]/Trauma Services  Additional Discharge Instructions    Call [phone] for any questions/concerns and for follow up appointment in 2 weeks    Please follow the instructions checked below:      ACTIVITY INSTRUCTIONS:  [x]Increase activity as tolerated    []Elevate affected/operative limb   [x]No heavy lifting or strenuous activity     [x]No driving until cleared by trauma services  []No driving while taking pain medication  [x]Cough and deep breath 4 - 6 times a day   [x]Incentive Spirometer 4 - 6 times a day   []Other     WOUND/DRESSING INSTRUCTIONS:  [x]May shower      [x]No sitting in bath tub, hot tub or swimming. []Ice to areas of pain for first 24 hours. Heat to areas of pain after that. []Wash area with antibacterial soap & water. Rinse well. Pat dry with clean towel. Apply thin layer of Bacitracin to affected area. Keep wounds clean and dry. []Sutures/Staples to be removed in. Call for an appointment. []Other         MEDICATION INSTRUCTIONS:  [x]Take medication as prescribed. [x]When taking pain medications, you may experience dizziness or drowsiness. Do not drink alcohol or drive when taking these medications. [x]You may take Ibuprofen (over the counter) as per directions for mild pain every 6-8 hours. [x]You may take Tylenol every 4 hours as needed for pain  []You may experience constipation while taking pain medication - You may take over the counter stool softeners: docuscate (Colace) or sennosides S (Senokot - S).      WORK:  [x]You may not return to work until cleared by [facility] physician for any of the following or for questions/concerns:   · Fever over 101° F    · Redness, swelling, hardness or warmth at the wound site (s)  · Unrelieved nausea/vomiting    · Foul smelling or cloudy drainage at the wound site (s)   · Unrelieved pain or increase in pain     · Increase in shortness of breath Subsequent Stages Histo Method Verbiage: Using a similar technique to that described above, a thin layer of tissue was removed from all areas where tumor was visible on the previous stage.  The tissue was again oriented, mapped, dyed, and processed as above.

## 2019-02-08 ENCOUNTER — HOSPITAL ENCOUNTER (OUTPATIENT)
Dept: CT IMAGING | Age: 49
Discharge: HOME | End: 2019-02-10
Payer: COMMERCIAL

## 2019-02-08 DIAGNOSIS — R09.89 ABNORMAL CHEST SOUNDS: ICD-10-CM

## 2019-02-08 DIAGNOSIS — S22.42XD MULTIPLE FRACTURES OF RIBS OF LEFT SIDE WITH ROUTINE HEALING: ICD-10-CM

## 2019-02-08 PROCEDURE — 71250 CT THORAX DX C-: CPT

## 2019-02-18 ENCOUNTER — TELEPHONE (OUTPATIENT)
Dept: ONCOLOGY | Age: 49
End: 2019-02-18

## 2019-02-18 DIAGNOSIS — Z85.3 PERSONAL HISTORY OF BREAST CANCER: Primary | ICD-10-CM

## 2019-04-04 VITALS
TEMPERATURE: 96.9 F | HEIGHT: 62 IN | BODY MASS INDEX: 32.76 KG/M2 | WEIGHT: 178 LBS | HEART RATE: 86 BPM | DIASTOLIC BLOOD PRESSURE: 62 MMHG | SYSTOLIC BLOOD PRESSURE: 114 MMHG

## 2019-05-10 ENCOUNTER — OFFICE VISIT (OUTPATIENT)
Dept: FAMILY MEDICINE CLINIC | Age: 49
End: 2019-05-10
Payer: COMMERCIAL

## 2019-05-10 VITALS
BODY MASS INDEX: 34.78 KG/M2 | WEIGHT: 189 LBS | SYSTOLIC BLOOD PRESSURE: 114 MMHG | HEART RATE: 80 BPM | OXYGEN SATURATION: 96 % | DIASTOLIC BLOOD PRESSURE: 72 MMHG | HEIGHT: 62 IN | TEMPERATURE: 97.6 F

## 2019-05-10 DIAGNOSIS — G99.0 PERIPHERAL AUTONOMIC NEUROPATHY IN DISORDERS CLASSIFIED ELSEWHERE: ICD-10-CM

## 2019-05-10 DIAGNOSIS — L29.3 GENITAL PRURITUS: ICD-10-CM

## 2019-05-10 DIAGNOSIS — G43.009 MIGRAINE WITHOUT AURA AND WITHOUT STATUS MIGRAINOSUS, NOT INTRACTABLE: ICD-10-CM

## 2019-05-10 DIAGNOSIS — E78.2 MIXED HYPERLIPIDEMIA: ICD-10-CM

## 2019-05-10 DIAGNOSIS — K21.9 GASTROESOPHAGEAL REFLUX DISEASE WITHOUT ESOPHAGITIS: ICD-10-CM

## 2019-05-10 DIAGNOSIS — G47.09 OTHER INSOMNIA: ICD-10-CM

## 2019-05-10 DIAGNOSIS — M85.89 OSTEOPENIA OF MULTIPLE SITES: ICD-10-CM

## 2019-05-10 DIAGNOSIS — C50.512 MALIGNANT NEOPLASM OF LOWER-OUTER QUADRANT OF LEFT FEMALE BREAST, UNSPECIFIED ESTROGEN RECEPTOR STATUS (HCC): Primary | ICD-10-CM

## 2019-05-10 DIAGNOSIS — R53.83 OTHER FATIGUE: ICD-10-CM

## 2019-05-10 DIAGNOSIS — F41.1 GENERALIZED ANXIETY DISORDER: ICD-10-CM

## 2019-05-10 DIAGNOSIS — K12.0 RECURRENT ORAL APHTHAE: ICD-10-CM

## 2019-05-10 DIAGNOSIS — N95.1 VASOMOTOR SYMPTOMS DUE TO MENOPAUSE: ICD-10-CM

## 2019-05-10 DIAGNOSIS — E55.9 VITAMIN D DEFICIENCY: ICD-10-CM

## 2019-05-10 DIAGNOSIS — M81.8 OTHER OSTEOPOROSIS WITHOUT CURRENT PATHOLOGICAL FRACTURE: ICD-10-CM

## 2019-05-10 PROCEDURE — 99214 OFFICE O/P EST MOD 30 MIN: CPT | Performed by: INTERNAL MEDICINE

## 2019-05-10 RX ORDER — PANTOPRAZOLE SODIUM 40 MG/1
40 TABLET, DELAYED RELEASE ORAL DAILY
Refills: 2 | COMMUNITY
Start: 2019-04-19 | End: 2019-05-10 | Stop reason: SDUPTHER

## 2019-05-10 RX ORDER — PANTOPRAZOLE SODIUM 40 MG/1
40 TABLET, DELAYED RELEASE ORAL DAILY
Qty: 30 TABLET | Refills: 5 | Status: SHIPPED | OUTPATIENT
Start: 2019-05-10 | End: 2019-11-08

## 2019-05-10 RX ORDER — GABAPENTIN 100 MG/1
100 CAPSULE ORAL DAILY
Qty: 30 CAPSULE | Refills: 5 | Status: SHIPPED | OUTPATIENT
Start: 2019-05-10 | End: 2019-11-08

## 2019-05-10 RX ORDER — DULOXETIN HYDROCHLORIDE 20 MG/1
20 CAPSULE, DELAYED RELEASE ORAL DAILY
Qty: 30 CAPSULE | Refills: 5 | Status: SHIPPED | OUTPATIENT
Start: 2019-05-10 | End: 2019-11-08 | Stop reason: SDUPTHER

## 2019-05-10 ASSESSMENT — ENCOUNTER SYMPTOMS
SORE THROAT: 0
ABDOMINAL PAIN: 0
CHEST TIGHTNESS: 0
EYE PAIN: 0
SHORTNESS OF BREATH: 0
NAUSEA: 0
CONSTIPATION: 0
COUGH: 0
RHINORRHEA: 0
BLOOD IN STOOL: 0
DIARRHEA: 1
VOMITING: 0

## 2019-05-10 NOTE — PROGRESS NOTES
 Alcohol use: No    Drug use: No    Sexual activity: Not on file   Lifestyle    Physical activity:     Days per week: Not on file     Minutes per session: Not on file    Stress: Not on file   Relationships    Social connections:     Talks on phone: Not on file     Gets together: Not on file     Attends Worship service: Not on file     Active member of club or organization: Not on file     Attends meetings of clubs or organizations: Not on file     Relationship status: Not on file    Intimate partner violence:     Fear of current or ex partner: Not on file     Emotionally abused: Not on file     Physically abused: Not on file     Forced sexual activity: Not on file   Other Topics Concern    Not on file   Social History Narrative    Not on file       Vitals:    05/10/19 0850   BP: 114/72   Pulse: 80   Temp: 97.6 °F (36.4 °C)   SpO2: 96%   Weight: 189 lb (85.7 kg)   Height: 5' 2\" (1.575 m)       Exam:  Physical Exam   Constitutional: She is oriented to person, place, and time. She appears well-developed and well-nourished. HENT:   Head: Normocephalic and atraumatic. Right Ear: External ear normal.   Left Ear: External ear normal.   Mouth/Throat: Oropharynx is clear and moist.   Eyes: Pupils are equal, round, and reactive to light. EOM are normal.   Neck: Normal range of motion. Neck supple. No thyromegaly present. Cardiovascular: Normal rate, regular rhythm and normal heart sounds. No murmur heard. Pulmonary/Chest: Effort normal and breath sounds normal. She has no wheezes. Abdominal: Soft. Bowel sounds are normal. She exhibits no distension. There is no tenderness. There is no rebound and no guarding. Musculoskeletal: Normal range of motion. She exhibits no edema. Lymphadenopathy:     She has no cervical adenopathy. Neurological: She is alert and oriented to person, place, and time. No cranial nerve deficit. Skin: Skin is warm and dry. Psychiatric: She has a normal mood and affect. Judgment normal.       Assessment and Plan:    Diagnoses and all orders for this visit:    Malignant neoplasm of lower-outer quadrant of left female breast, unspecified estrogen receptor status ([facility] [address]  - continue present treatment   - s/p bilateral mastectomy   - following with oncology  - off arimadex  - on letrozole  - stable     Migraine without aura and without status migrainosus, not intractable  - on excedrin  - discussed prophylactic meds - declines   - stopped topamax - does not think is helping  - stable  - headache 4 per week - migraine headache 2/month    Generalized anxiety disorder  - off venlafaxine  - off prozac  - on Cymbalta 20mg  - Stable     Vasomotor symptoms due to menopause   - continue present treatment  - off prozac  - on calcium and vitamin D  - on gabapentin   - on cymbalta     Other insomnia  - continue present treatment  - observe  - off meds  - discussed benadryl  - on melatonin    Osteopenia of multiple sites  - Continue present treatment   - on prolia every 6 months   - last DEXA 2018     Vitamin D deficiency  - Continue present treatment   - on otc supplement   - follow labs     Peripheral autonomic neuropathy in disorders classified elsewhere  - continue present treatment  - on Cymbalta  - on gabapentin    Mixed hyperlipidemia  - Continue present treatment   - watch diet   - may need to consider meds, calculate 10year risk with next blood work     Gastroesophageal reflux disease without esophagitis  - Continue present treatment   - watch diet   - on pantoprazole   - stable     Genital pruritus  - On benadryl as needed     Recurrent oral aphthae  - continue present treatment  - uncertain as to cause  - uncertain if side effects of meds  - Magic Mouthwash swish and swallow 1tsp q4hr as needed     Return in about 6 months (around 11/10/2019) for check up and review.       Orders Placed This Encounter   Procedures    CBC with Differential     Standing Status:   Future     Standing Expiration Date:   5/10/2020    Comprehensive Metabolic Panel     Standing Status:   Future     Standing Expiration Date:   5/10/2020    Lipid, Fasting     Standing Status:   Future     Standing Expiration Date:   5/10/2020    Magnesium     Standing Status:   Future     Standing Expiration Date:   5/10/2020    TSH without Reflex     Standing Status:   Future     Standing Expiration Date:   5/10/2020    Urinalysis     Standing Status:   Future     Standing Expiration Date:   5/10/2020    Vitamin D 25 Hydroxy     Standing Status:   Future     Standing Expiration Date:   5/10/2020       Vitor Tang MD  5/10/2019  9:30 AM

## 2019-06-11 ENCOUNTER — OFFICE VISIT (OUTPATIENT)
Dept: FAMILY MEDICINE CLINIC | Age: 49
End: 2019-06-11
Payer: COMMERCIAL

## 2019-06-11 VITALS
WEIGHT: 182.6 LBS | DIASTOLIC BLOOD PRESSURE: 78 MMHG | BODY MASS INDEX: 33.6 KG/M2 | HEART RATE: 83 BPM | SYSTOLIC BLOOD PRESSURE: 120 MMHG | TEMPERATURE: 98.2 F | OXYGEN SATURATION: 98 % | HEIGHT: 62 IN

## 2019-06-11 DIAGNOSIS — J06.9 UPPER RESPIRATORY TRACT INFECTION, UNSPECIFIED TYPE: Primary | ICD-10-CM

## 2019-06-11 DIAGNOSIS — R09.82 POSTNASAL DRIP: ICD-10-CM

## 2019-06-11 DIAGNOSIS — J01.90 ACUTE NON-RECURRENT SINUSITIS, UNSPECIFIED LOCATION: ICD-10-CM

## 2019-06-11 DIAGNOSIS — R07.0 PAIN IN THROAT: ICD-10-CM

## 2019-06-11 PROCEDURE — 99213 OFFICE O/P EST LOW 20 MIN: CPT | Performed by: PHYSICIAN ASSISTANT

## 2019-06-11 RX ORDER — DOXYCYCLINE HYCLATE 100 MG
100 TABLET ORAL 2 TIMES DAILY
Qty: 20 TABLET | Refills: 0 | Status: SHIPPED | OUTPATIENT
Start: 2019-06-11 | End: 2019-06-21

## 2019-06-11 RX ORDER — DEXTROMETHORPHAN HYDROBROMIDE AND PROMETHAZINE HYDROCHLORIDE 15; 6.25 MG/5ML; MG/5ML
5 SYRUP ORAL 4 TIMES DAILY PRN
Qty: 120 ML | Refills: 0 | Status: SHIPPED | OUTPATIENT
Start: 2019-06-11 | End: 2019-06-18

## 2019-06-11 NOTE — PROGRESS NOTES
19  Arabella Jackson : 1970 Sex: female  Age 51 y.o. Subjective:  Chief Complaint   Patient presents with    Cough    Headache    Pharyngitis         HPI:   Arabella Jackson , 51 y.o. female presents to express care for evaluation of cough, congestion, rhinorrhea, sore throat and sinus headache. Patient has had the symptoms have been ongoing for the last for 5 days. Seem to be getting worse. The patient been using over-the-counter products without much resolution. The patient has been taking Claritin. She is tried NyQuil. The patient is not having any back pain or flank pain. No chest pain or shortness of breath. No abdominal pain. No hemoptysis. The cough is dry and nonproductive. ROS:   Unless otherwise stated in this report the patient's positive and negative responses for review of systems for constitutional, eyes, ENT, cardiovascular, respiratory, gastrointestinal, neurological, , musculoskeletal, and integument systems and related systems to the presenting problem are either stated in the history of present illness or were not pertinent or were negative for the symptoms and/or complaints related to the presenting medical problem. Positives and pertinent negatives as per HPI. All others reviewed and are negative.       PMH:     Past Medical History:   Diagnosis Date    Back pain     Cancer ([facility] [address] 2014    breast    Gastroesophageal reflux disease without esophagitis 5/10/2019    Generalized anxiety disorder 5/10/2019    Migraine without aura and without status migrainosus, not intractable 5/10/2019    Mixed hyperlipidemia 5/10/2019    Osteopenia of multiple sites 5/10/2019    Other insomnia 5/10/2019    Other osteoporosis without current pathological fracture 5/10/2019    Peripheral autonomic neuropathy in disorders classified elsewhere 5/10/2019    Recurrent oral aphthae 5/10/2019       Past Surgical History:   Procedure Laterality Date    BREAST SURGERY Bilateral 2014    Mastectomy    BREAST SURGERY Bilateral 2014    Implant    EYE SURGERY Bilateral     lasik    HYSTERECTOMY      MASTECTOMY Bilateral 2014    INSERTION OF TISSUE EXPANDERS    SHOULDER SURGERY Right 2015     Medications:     Current Outpatient Medications:     DULoxetine (CYMBALTA) 20 MG extended release capsule, Take 1 capsule by mouth daily, Disp: 30 capsule, Rfl: 5    gabapentin (NEURONTIN) 100 MG capsule, Take 1 capsule by mouth daily for 180 days. , Disp: 30 capsule, Rfl: 5    pantoprazole (PROTONIX) 40 MG tablet, Take 1 tablet by mouth daily, Disp: 30 tablet, Rfl: 5    diphenhydrAMINE (BENADRYL) 25 MG capsule, Take 25 mg by mouth every 6 hours as needed for Itching, Disp: , Rfl:     letrozole (FEMARA) 2.5 MG tablet, Take 1 tablet by mouth daily, Disp: 90 tablet, Rfl: 3    diclofenac (VOLTAREN) 75 MG EC tablet, Take 1 tablet by mouth 2 times daily, Disp: 20 tablet, Rfl: 0    Melatonin 1 MG CAPS, Take 1 capsule by mouth nightly, Disp: , Rfl:     acetaminophen (TYLENOL) 325 MG tablet, Take 650 mg by mouth every 6 hours as needed for Pain, Disp: , Rfl:     ibuprofen (ADVIL;MOTRIN) 400 MG tablet, Take 400 mg by mouth every 6 hours as needed for Pain LD 16 per surgeon, Disp: , Rfl:     Calcium Citrate (CITRACAL PO), Take 1 tablet by mouth daily LD 16, Disp: , Rfl:     Multiple Vitamins-Minerals (THERAPEUTIC MULTIVITAMIN-MINERALS) tablet, Take 1 tablet by mouth nightly LD 16, Disp: , Rfl:     Allergies:   No Known Allergies    Social History:     Social History     Tobacco Use    Smoking status: Former Smoker     Last attempt to quit: 2/3/2000     Years since quittin.3    Smokeless tobacco: Never Used   Substance Use Topics    Alcohol use: No    Drug use: No       Physical Exam:     Vitals:    19 1423   BP: 120/78   Site: Right Upper Arm   Position: Sitting   Pulse: 83   Temp: 98.2 °F (36.8 °C)   SpO2: 98%   Weight: 182 lb 9.6 oz (82.8 kg) Height: 5' 2\" (1.575 m)       Exam:  Physical Exam  Nurse's notes and vital signs reviewed. The patient is not hypoxic. General: Alert, no acute distress, patient resting comfortably Patient is not toxic or lethargic. Skin: Warm, intact, no pallor noted. There is no evidence of rash at this time. Head: Normocephalic, atraumatic. Eye: Normal conjunctiva  Ears, Nose, Throat: Right tympanic membrane clear, left tympanic membrane clear. No drainage or discharge noted. No pre- or post-auricular tenderness, erythema, or swelling noted. Moderate nasal congestion rhinorrhea. No facial erythema. Posterior oropharynx shows erythema and cobblestoning, but no hypertrophy, asymmetry or peritonsillar abscess and no evidence of exudate. the uvula is midline. No trismus or drooling is noted. Moist mucous membranes. Neck: No anterior/posterior lymphadenopathy noted. No erythema, no masses, no fluctuance or induration noted. No meningeal signs. Cardio: Regular Rate and Rhythm  Respiratory: No acute distress, no rhonchi, wheezing or crackles noted. No stridor or retractions are noted. Neurological: A&O x4, normal speech  Psychiatric: Cooperative         Testing:           Medical Decision Making:     The patient has been seen and evaluated. The vital signs have been reviewed. The patient does not appear to be toxic or lethargic. The patient was educated on the proper dosage of motrin and tylenol and the appropriate intervals of each. The patient is to increase fluid intake over the next several days. The patient will be started on doxycycline, Promethazine DM. The patient is to return to express care or go directly to the emergency department should any of the signs or symptoms worsen. The patient is to followup with primary care physician in 2-3 days for repeat evaluation. The patient has no other questions or concerns at this time the patient will be discharged home.         Clinical Impression:   Arabella was seen today for cough, headache and pharyngitis. Diagnoses and all orders for this visit:    Upper respiratory tract infection, unspecified type    Acute non-recurrent sinusitis, unspecified location    Postnasal drip    Pain in throat    Other orders  -     doxycycline hyclate (VIBRA-TABS) 100 MG tablet; Take 1 tablet by mouth 2 times daily for 10 days  -     promethazine-dextromethorphan (PROMETHAZINE-DM) 6.25-15 MG/5ML syrup; Take 5 mLs by mouth 4 times daily as needed for Cough        The patient is to call for any concerns or return if any of the signs or symptoms worsen. The patient is to follow-up with PCP in the next 2-3 days for repeat evaluation repeat assessment or go directly to the emergency department.      SIGNATURE: Neri Murillo III, PA-C

## 2019-07-10 ENCOUNTER — HOSPITAL ENCOUNTER (OUTPATIENT)
Dept: INFUSION THERAPY | Age: 49
Discharge: HOME | End: 2019-07-10
Payer: COMMERCIAL

## 2019-07-10 ENCOUNTER — OFFICE VISIT (OUTPATIENT)
Dept: ONCOLOGY | Age: 49
End: 2019-07-10
Payer: COMMERCIAL

## 2019-07-10 VITALS
SYSTOLIC BLOOD PRESSURE: 107 MMHG | DIASTOLIC BLOOD PRESSURE: 63 MMHG | WEIGHT: 178.1 LBS | TEMPERATURE: 97.4 F | BODY MASS INDEX: 32.77 KG/M2 | HEART RATE: 87 BPM | HEIGHT: 62 IN

## 2019-07-10 DIAGNOSIS — C50.912 MALIGNANT NEOPLASM OF LEFT BREAST IN FEMALE, ESTROGEN RECEPTOR POSITIVE, UNSPECIFIED SITE OF BREAST (HCC): ICD-10-CM

## 2019-07-10 DIAGNOSIS — Z85.3 PERSONAL HISTORY OF BREAST CANCER: Primary | ICD-10-CM

## 2019-07-10 DIAGNOSIS — Z17.0 MALIGNANT NEOPLASM OF LEFT BREAST IN FEMALE, ESTROGEN RECEPTOR POSITIVE, UNSPECIFIED SITE OF BREAST (HCC): ICD-10-CM

## 2019-07-10 DIAGNOSIS — Z09 FOLLOW UP: Primary | ICD-10-CM

## 2019-07-10 LAB
ALBUMIN SERPL-MCNC: 4.9 G/DL (ref 3.5–5.2)
ALP BLD-CCNC: 77 U/L (ref 35–104)
ALT SERPL-CCNC: 21 U/L (ref 0–32)
ANION GAP SERPL CALCULATED.3IONS-SCNC: 12 MMOL/L (ref 7–16)
AST SERPL-CCNC: 17 U/L (ref 0–31)
BASOPHILS ABSOLUTE: 0.04 E9/L (ref 0–0.2)
BASOPHILS RELATIVE PERCENT: 0.6 % (ref 0–2)
BILIRUB SERPL-MCNC: 0.3 MG/DL (ref 0–1.2)
BUN BLDV-MCNC: 19 MG/DL (ref 6–20)
CALCIUM SERPL-MCNC: 10.7 MG/DL (ref 8.6–10.2)
CHLORIDE BLD-SCNC: 99 MMOL/L (ref 98–107)
CO2: 27 MMOL/L (ref 22–29)
CREAT SERPL-MCNC: 0.8 MG/DL (ref 0.5–1)
EOSINOPHILS ABSOLUTE: 0.12 E9/L (ref 0.05–0.5)
EOSINOPHILS RELATIVE PERCENT: 1.7 % (ref 0–6)
GFR AFRICAN AMERICAN: >60
GFR NON-AFRICAN AMERICAN: >60 ML/MIN/1.73
GLUCOSE BLD-MCNC: 128 MG/DL (ref 74–99)
HCT VFR BLD CALC: 38.7 % (ref 34–48)
HEMOGLOBIN: 13.1 G/DL (ref 11.5–15.5)
IMMATURE GRANULOCYTES #: 0.01 E9/L
IMMATURE GRANULOCYTES %: 0.1 % (ref 0–5)
LYMPHOCYTES ABSOLUTE: 2.08 E9/L (ref 1.5–4)
LYMPHOCYTES RELATIVE PERCENT: 29.6 % (ref 20–42)
MCH RBC QN AUTO: 30.1 PG (ref 26–35)
MCHC RBC AUTO-ENTMCNC: 33.9 % (ref 32–34.5)
MCV RBC AUTO: 89 FL (ref 80–99.9)
MONOCYTES ABSOLUTE: 0.41 E9/L (ref 0.1–0.95)
MONOCYTES RELATIVE PERCENT: 5.8 % (ref 2–12)
NEUTROPHILS ABSOLUTE: 4.36 E9/L (ref 1.8–7.3)
NEUTROPHILS RELATIVE PERCENT: 62.2 % (ref 43–80)
PDW BLD-RTO: 12.9 FL (ref 11.5–15)
PLATELET # BLD: 272 E9/L (ref 130–450)
PMV BLD AUTO: 11.3 FL (ref 7–12)
POTASSIUM SERPL-SCNC: 4.1 MMOL/L (ref 3.5–5)
RBC # BLD: 4.35 E12/L (ref 3.5–5.5)
SODIUM BLD-SCNC: 138 MMOL/L (ref 132–146)
TOTAL PROTEIN: 8.2 G/DL (ref 6.4–8.3)
WBC # BLD: 7 E9/L (ref 4.5–11.5)

## 2019-07-10 PROCEDURE — 6360000002 HC RX W HCPCS: Performed by: INTERNAL MEDICINE

## 2019-07-10 PROCEDURE — G8427 DOCREV CUR MEDS BY ELIG CLIN: HCPCS | Performed by: INTERNAL MEDICINE

## 2019-07-10 PROCEDURE — 96372 THER/PROPH/DIAG INJ SC/IM: CPT

## 2019-07-10 PROCEDURE — 99212 OFFICE O/P EST SF 10 MIN: CPT

## 2019-07-10 PROCEDURE — 1036F TOBACCO NON-USER: CPT | Performed by: INTERNAL MEDICINE

## 2019-07-10 PROCEDURE — 85025 COMPLETE CBC W/AUTO DIFF WBC: CPT

## 2019-07-10 PROCEDURE — 99214 OFFICE O/P EST MOD 30 MIN: CPT | Performed by: INTERNAL MEDICINE

## 2019-07-10 PROCEDURE — 80053 COMPREHEN METABOLIC PANEL: CPT

## 2019-07-10 PROCEDURE — G8417 CALC BMI ABV UP PARAM F/U: HCPCS | Performed by: INTERNAL MEDICINE

## 2019-07-10 PROCEDURE — 36415 COLL VENOUS BLD VENIPUNCTURE: CPT

## 2019-07-10 RX ORDER — TIZANIDINE HYDROCHLORIDE 2 MG/1
CAPSULE, GELATIN COATED ORAL
COMMUNITY
Start: 2019-01-25 | End: 2019-11-08

## 2019-07-10 RX ADMIN — DENOSUMAB 60 MG: 60 INJECTION SUBCUTANEOUS at 15:38

## 2019-07-10 NOTE — PROGRESS NOTES
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 Primary measured 2.5 cm and 0/4 LN involved. 2/4 LN with isolated tumor cells. Oncotype Dx score of 17 portending minimal benefit from adjuvant chemotherapy added to her usual locoregional therapy and endocrine therapy. 11% risk of recurrence is with 5 yrs of Tamoxifen. She is s/p bilateral mastectomy and left SLND 8/19/14, LOIS/BSO on 10/21/2014. On endocrine therapy since 9/2014. Hot flashes, anxiety/depression and significant arthralgias experienced on Arimidex. Due to above sx; Arimidex was discontinued. She was switched to Femara 2.5 mg po daily since 10/26/15. DEXA scan on 12/20/2018 osteopenia of lumbar spine (7.2% decrease from prior study; Normal in femoral necks (3.5% increase from prior study). On Ca/VitD, Prolia q6months. She continues to tolerate Femara well. Arthralgias, not affecting quality of life. Hot flashes better on Femara. Headache followed by PCP. No clinical evidence of recurrence. Continue Femara until Sept 2019. RTC PRN.  She will ask her PCP to follow up on future DEXA scan    7/10/19  Lyndsey Ware MD  Medical Oncologist  [facility] MEDICAL ONCOLOGY  [address] [address]

## 2019-07-22 ENCOUNTER — TELEPHONE (OUTPATIENT)
Dept: FAMILY MEDICINE CLINIC | Age: 49
End: 2019-07-22

## 2019-07-22 NOTE — TELEPHONE ENCOUNTER
Arabella calling to tell you that her oncologist, Dr Kim Sensing told her that he does not need to see her anymore. He took her off Femara. She would like to come off of some of the meds you have her on as well. She does not see you until December, but would like to start weaning off now. She will need you to order a bone scan for her since she will not see Dr Vivi Carvajal again. Are you willing to do that?

## 2019-07-24 NOTE — TELEPHONE ENCOUNTER
Would take gabapentin every other day x 10 days to 2 weeks then stop     The would take protonix every other day x 1 0 days to 2 weeks then stop     Let us know if problems   Thanks

## 2019-11-08 ENCOUNTER — OFFICE VISIT (OUTPATIENT)
Dept: FAMILY MEDICINE CLINIC | Age: 49
End: 2019-11-08
Payer: COMMERCIAL

## 2019-11-08 ENCOUNTER — HOSPITAL ENCOUNTER (OUTPATIENT)
Age: 49
Discharge: HOME | End: 2019-11-10
Payer: COMMERCIAL

## 2019-11-08 VITALS
HEART RATE: 74 BPM | WEIGHT: 163 LBS | BODY MASS INDEX: 30 KG/M2 | DIASTOLIC BLOOD PRESSURE: 70 MMHG | HEIGHT: 62 IN | SYSTOLIC BLOOD PRESSURE: 110 MMHG | OXYGEN SATURATION: 98 % | TEMPERATURE: 98 F

## 2019-11-08 DIAGNOSIS — F41.1 GENERALIZED ANXIETY DISORDER: ICD-10-CM

## 2019-11-08 DIAGNOSIS — R53.83 OTHER FATIGUE: ICD-10-CM

## 2019-11-08 DIAGNOSIS — G99.0 PERIPHERAL AUTONOMIC NEUROPATHY IN DISORDERS CLASSIFIED ELSEWHERE: ICD-10-CM

## 2019-11-08 DIAGNOSIS — M85.89 OSTEOPENIA OF MULTIPLE SITES: ICD-10-CM

## 2019-11-08 DIAGNOSIS — N95.1 VASOMOTOR SYMPTOMS DUE TO MENOPAUSE: ICD-10-CM

## 2019-11-08 DIAGNOSIS — K12.0 RECURRENT ORAL APHTHAE: ICD-10-CM

## 2019-11-08 DIAGNOSIS — L29.3 GENITAL PRURITUS: ICD-10-CM

## 2019-11-08 DIAGNOSIS — K21.9 GASTROESOPHAGEAL REFLUX DISEASE WITHOUT ESOPHAGITIS: ICD-10-CM

## 2019-11-08 DIAGNOSIS — G47.09 OTHER INSOMNIA: ICD-10-CM

## 2019-11-08 DIAGNOSIS — G43.009 MIGRAINE WITHOUT AURA AND WITHOUT STATUS MIGRAINOSUS, NOT INTRACTABLE: ICD-10-CM

## 2019-11-08 DIAGNOSIS — E78.2 MIXED HYPERLIPIDEMIA: ICD-10-CM

## 2019-11-08 DIAGNOSIS — E55.9 VITAMIN D DEFICIENCY: Primary | ICD-10-CM

## 2019-11-08 DIAGNOSIS — C50.512 MALIGNANT NEOPLASM OF LOWER-OUTER QUADRANT OF LEFT FEMALE BREAST, UNSPECIFIED ESTROGEN RECEPTOR STATUS (HCC): ICD-10-CM

## 2019-11-08 DIAGNOSIS — E55.9 VITAMIN D DEFICIENCY: ICD-10-CM

## 2019-11-08 LAB
ALBUMIN SERPL-MCNC: 4.9 G/DL (ref 3.5–5.2)
ALP BLD-CCNC: 48 U/L (ref 35–104)
ALT SERPL-CCNC: 16 U/L (ref 0–32)
ANION GAP SERPL CALCULATED.3IONS-SCNC: 14 MMOL/L (ref 7–16)
AST SERPL-CCNC: 16 U/L (ref 0–31)
BACTERIA: NORMAL /HPF
BASOPHILS ABSOLUTE: 0.07 E9/L (ref 0–0.2)
BASOPHILS RELATIVE PERCENT: 1.1 % (ref 0–2)
BILIRUB SERPL-MCNC: 0.3 MG/DL (ref 0–1.2)
BILIRUBIN URINE: NEGATIVE
BLOOD, URINE: NEGATIVE
BUN BLDV-MCNC: 17 MG/DL (ref 6–20)
CALCIUM SERPL-MCNC: 10.1 MG/DL (ref 8.6–10.2)
CHLORIDE BLD-SCNC: 104 MMOL/L (ref 98–107)
CHOLESTEROL, FASTING: 196 MG/DL (ref 0–199)
CLARITY: CLEAR
CO2: 25 MMOL/L (ref 22–29)
COLOR: YELLOW
CREAT SERPL-MCNC: 0.8 MG/DL (ref 0.5–1)
EOSINOPHILS ABSOLUTE: 0.23 E9/L (ref 0.05–0.5)
EOSINOPHILS RELATIVE PERCENT: 3.5 % (ref 0–6)
EPITHELIAL CELLS, UA: NORMAL /HPF
GFR AFRICAN AMERICAN: >60
GFR NON-AFRICAN AMERICAN: >60 ML/MIN/1.73
GLUCOSE BLD-MCNC: 101 MG/DL (ref 74–99)
GLUCOSE URINE: NEGATIVE MG/DL
HCT VFR BLD CALC: 41.4 % (ref 34–48)
HDLC SERPL-MCNC: 45 MG/DL
HEMOGLOBIN: 13.1 G/DL (ref 11.5–15.5)
IMMATURE GRANULOCYTES #: 0.02 E9/L
IMMATURE GRANULOCYTES %: 0.3 % (ref 0–5)
KETONES, URINE: NEGATIVE MG/DL
LDL CHOLESTEROL CALCULATED: 124 MG/DL (ref 0–99)
LEUKOCYTE ESTERASE, URINE: ABNORMAL
LYMPHOCYTES ABSOLUTE: 1.99 E9/L (ref 1.5–4)
LYMPHOCYTES RELATIVE PERCENT: 29.9 % (ref 20–42)
MAGNESIUM: 2.5 MG/DL (ref 1.6–2.6)
MCH RBC QN AUTO: 29.4 PG (ref 26–35)
MCHC RBC AUTO-ENTMCNC: 31.6 % (ref 32–34.5)
MCV RBC AUTO: 93 FL (ref 80–99.9)
MONOCYTES ABSOLUTE: 0.34 E9/L (ref 0.1–0.95)
MONOCYTES RELATIVE PERCENT: 5.1 % (ref 2–12)
NEUTROPHILS ABSOLUTE: 4 E9/L (ref 1.8–7.3)
NEUTROPHILS RELATIVE PERCENT: 60.1 % (ref 43–80)
NITRITE, URINE: NEGATIVE
PDW BLD-RTO: 13.8 FL (ref 11.5–15)
PH UA: 6.5 (ref 5–9)
PLATELET # BLD: 290 E9/L (ref 130–450)
PMV BLD AUTO: 11.9 FL (ref 7–12)
POTASSIUM SERPL-SCNC: 4.5 MMOL/L (ref 3.5–5)
PROTEIN UA: NEGATIVE MG/DL
RBC # BLD: 4.45 E12/L (ref 3.5–5.5)
RBC UA: NORMAL /HPF (ref 0–2)
SODIUM BLD-SCNC: 143 MMOL/L (ref 132–146)
SPECIFIC GRAVITY UA: 1.01 (ref 1–1.03)
TOTAL PROTEIN: 7.7 G/DL (ref 6.4–8.3)
TRIGLYCERIDE, FASTING: 134 MG/DL (ref 0–149)
TSH SERPL DL<=0.05 MIU/L-ACNC: 1.05 UIU/ML (ref 0.27–4.2)
UROBILINOGEN, URINE: 0.2 E.U./DL
VITAMIN D 25-HYDROXY: 25 NG/ML (ref 30–100)
VLDLC SERPL CALC-MCNC: 27 MG/DL
WBC # BLD: 6.7 E9/L (ref 4.5–11.5)
WBC UA: NORMAL /HPF (ref 0–5)

## 2019-11-08 PROCEDURE — 84443 ASSAY THYROID STIM HORMONE: CPT

## 2019-11-08 PROCEDURE — 83735 ASSAY OF MAGNESIUM: CPT

## 2019-11-08 PROCEDURE — 82306 VITAMIN D 25 HYDROXY: CPT

## 2019-11-08 PROCEDURE — G8427 DOCREV CUR MEDS BY ELIG CLIN: HCPCS | Performed by: INTERNAL MEDICINE

## 2019-11-08 PROCEDURE — 1036F TOBACCO NON-USER: CPT | Performed by: INTERNAL MEDICINE

## 2019-11-08 PROCEDURE — G8484 FLU IMMUNIZE NO ADMIN: HCPCS | Performed by: INTERNAL MEDICINE

## 2019-11-08 PROCEDURE — G8417 CALC BMI ABV UP PARAM F/U: HCPCS | Performed by: INTERNAL MEDICINE

## 2019-11-08 PROCEDURE — 80053 COMPREHEN METABOLIC PANEL: CPT

## 2019-11-08 PROCEDURE — 80061 LIPID PANEL: CPT

## 2019-11-08 PROCEDURE — 36415 COLL VENOUS BLD VENIPUNCTURE: CPT

## 2019-11-08 PROCEDURE — 99213 OFFICE O/P EST LOW 20 MIN: CPT | Performed by: INTERNAL MEDICINE

## 2019-11-08 PROCEDURE — 81001 URINALYSIS AUTO W/SCOPE: CPT

## 2019-11-08 PROCEDURE — 85025 COMPLETE CBC W/AUTO DIFF WBC: CPT

## 2019-11-08 RX ORDER — DULOXETIN HYDROCHLORIDE 20 MG/1
20 CAPSULE, DELAYED RELEASE ORAL DAILY
Qty: 30 CAPSULE | Refills: 5 | Status: SHIPPED
Start: 2019-11-08 | End: 2020-05-01 | Stop reason: SDUPTHER

## 2019-11-08 ASSESSMENT — PATIENT HEALTH QUESTIONNAIRE - PHQ9
1. LITTLE INTEREST OR PLEASURE IN DOING THINGS: 0
SUM OF ALL RESPONSES TO PHQ QUESTIONS 1-9: 0
2. FEELING DOWN, DEPRESSED OR HOPELESS: 0
SUM OF ALL RESPONSES TO PHQ QUESTIONS 1-9: 0
SUM OF ALL RESPONSES TO PHQ9 QUESTIONS 1 & 2: 0

## 2019-11-08 ASSESSMENT — ENCOUNTER SYMPTOMS
DIARRHEA: 1
ABDOMINAL PAIN: 0
RHINORRHEA: 0
VOMITING: 0
COUGH: 0
NAUSEA: 0
EYE PAIN: 0
SORE THROAT: 0
SHORTNESS OF BREATH: 0
CHEST TIGHTNESS: 0
CONSTIPATION: 0
BLOOD IN STOOL: 0

## 2019-11-10 ENCOUNTER — TELEPHONE (OUTPATIENT)
Dept: FAMILY MEDICINE CLINIC | Age: 49
End: 2019-11-10

## 2019-12-20 ENCOUNTER — HOSPITAL ENCOUNTER (OUTPATIENT)
Dept: GENERAL RADIOLOGY | Age: 49
Discharge: HOME | End: 2019-12-22
Payer: COMMERCIAL

## 2019-12-20 DIAGNOSIS — M85.89 OSTEOPENIA OF MULTIPLE SITES: ICD-10-CM

## 2019-12-20 PROCEDURE — 77080 DXA BONE DENSITY AXIAL: CPT

## 2019-12-21 ENCOUNTER — TELEPHONE (OUTPATIENT)
Dept: FAMILY MEDICINE CLINIC | Age: 49
End: 2019-12-21

## 2020-01-03 ENCOUNTER — HOSPITAL ENCOUNTER (OUTPATIENT)
Age: 50
Discharge: HOME | End: 2020-01-05
Payer: COMMERCIAL

## 2020-01-03 ENCOUNTER — OFFICE VISIT (OUTPATIENT)
Dept: FAMILY MEDICINE CLINIC | Age: 50
End: 2020-01-03
Payer: COMMERCIAL

## 2020-01-03 VITALS
OXYGEN SATURATION: 95 % | HEART RATE: 103 BPM | SYSTOLIC BLOOD PRESSURE: 90 MMHG | TEMPERATURE: 96.6 F | DIASTOLIC BLOOD PRESSURE: 60 MMHG

## 2020-01-03 LAB
INFLUENZA A ANTIBODY: NORMAL
INFLUENZA B ANTIBODY: NORMAL
S PYO AG THROAT QL: NORMAL

## 2020-01-03 PROCEDURE — 99213 OFFICE O/P EST LOW 20 MIN: CPT | Performed by: INTERNAL MEDICINE

## 2020-01-03 PROCEDURE — 87804 INFLUENZA ASSAY W/OPTIC: CPT | Performed by: INTERNAL MEDICINE

## 2020-01-03 PROCEDURE — 87880 STREP A ASSAY W/OPTIC: CPT | Performed by: INTERNAL MEDICINE

## 2020-01-03 RX ORDER — OSELTAMIVIR PHOSPHATE 75 MG/1
75 CAPSULE ORAL 2 TIMES DAILY
Qty: 10 CAPSULE | Refills: 0 | Status: SHIPPED | OUTPATIENT
Start: 2020-01-03 | End: 2020-01-08

## 2020-01-03 RX ORDER — AMOXICILLIN 500 MG/1
500 CAPSULE ORAL 3 TIMES DAILY
Qty: 30 CAPSULE | Refills: 0 | Status: SHIPPED | OUTPATIENT
Start: 2020-01-03 | End: 2020-01-13

## 2020-01-04 ASSESSMENT — ENCOUNTER SYMPTOMS
SINUS PAIN: 0
SINUS PRESSURE: 0
VOMITING: 0
COUGH: 1
WHEEZING: 0
BACK PAIN: 1
CHEST TIGHTNESS: 0
DIARRHEA: 0
SORE THROAT: 1
NAUSEA: 0
SHORTNESS OF BREATH: 0
EYES NEGATIVE: 1
ABDOMINAL PAIN: 0

## 2020-01-05 NOTE — PROGRESS NOTES
Diagnosis Date    Back pain     Cancer ([facility] [address] 2014    breast MCCLAIN    Gastroesophageal reflux disease without esophagitis 5/10/2019    Generalized anxiety disorder 5/10/2019    Migraine without aura and without status migrainosus, not intractable 5/10/2019    Mixed hyperlipidemia 5/10/2019    Osteopenia of multiple sites 5/10/2019    Other insomnia 5/10/2019    Other osteoporosis without current pathological fracture 5/10/2019    Peripheral autonomic neuropathy in disorders classified elsewhere 5/10/2019    Recurrent oral aphthae 5/10/2019     Past Surgical History:   Procedure Laterality Date    BREAST SURGERY Bilateral 2014    Mastectomy    BREAST SURGERY Bilateral 2014    Implant    EYE SURGERY Bilateral     lasik    FRACTURE SURGERY Right     humerus, open reduction fixation shoulder    FRACTURE SURGERY  2016    removal of hardware    HYSTERECTOMY      MASTECTOMY Bilateral 2014    INSERTION OF TISSUE EXPANDERS    SHOULDER SURGERY Right 2015    WISDOM TOOTH EXTRACTION       Family History   Problem Relation Age of Onset    Cancer Paternal Grandmother 80        breast     Cancer Paternal Grandfather 79        prostate     Social History     Socioeconomic History    Marital status:      Spouse name: Not on file    Number of children: Not on file    Years of education: Not on file    Highest education level: Not on file   Occupational History    Not on file   Social Needs    Financial resource strain: Not on file    Food insecurity:     Worry: Not on file     Inability: Not on file    Transportation needs:     Medical: Not on file     Non-medical: Not on file   Tobacco Use    Smoking status: Former Smoker     Last attempt to quit: 2/3/2000     Years since quittin.9    Smokeless tobacco: Never Used   Substance and Sexual Activity    Alcohol use: No    Drug use: No    Sexual activity: Not on file   Lifestyle    Physical activity:     Days per

## 2020-01-06 ENCOUNTER — OFFICE VISIT (OUTPATIENT)
Dept: FAMILY MEDICINE CLINIC | Age: 50
End: 2020-01-06
Payer: COMMERCIAL

## 2020-01-06 VITALS
TEMPERATURE: 98.8 F | HEIGHT: 62 IN | BODY MASS INDEX: 30.44 KG/M2 | SYSTOLIC BLOOD PRESSURE: 120 MMHG | WEIGHT: 165.38 LBS | DIASTOLIC BLOOD PRESSURE: 84 MMHG | OXYGEN SATURATION: 94 % | HEART RATE: 71 BPM

## 2020-01-06 PROBLEM — J11.1 INFLUENZA: Status: ACTIVE | Noted: 2020-01-06

## 2020-01-06 PROCEDURE — 99213 OFFICE O/P EST LOW 20 MIN: CPT | Performed by: INTERNAL MEDICINE

## 2020-01-06 PROCEDURE — 96372 THER/PROPH/DIAG INJ SC/IM: CPT | Performed by: INTERNAL MEDICINE

## 2020-01-06 RX ORDER — METHYLPREDNISOLONE ACETATE 40 MG/ML
40 INJECTION, SUSPENSION INTRA-ARTICULAR; INTRALESIONAL; INTRAMUSCULAR; SOFT TISSUE ONCE
Status: COMPLETED | OUTPATIENT
Start: 2020-01-06 | End: 2020-01-06

## 2020-01-06 RX ADMIN — METHYLPREDNISOLONE ACETATE 40 MG: 40 INJECTION, SUSPENSION INTRA-ARTICULAR; INTRALESIONAL; INTRAMUSCULAR; SOFT TISSUE at 09:46

## 2020-01-06 ASSESSMENT — ENCOUNTER SYMPTOMS
VOMITING: 0
BLOOD IN STOOL: 0
CHEST TIGHTNESS: 0
CONSTIPATION: 0
ABDOMINAL PAIN: 0
NAUSEA: 0
COUGH: 0
SHORTNESS OF BREATH: 0
SORE THROAT: 0
EYE PAIN: 0
RHINORRHEA: 0
DIARRHEA: 1

## 2020-01-06 NOTE — PROGRESS NOTES
almost daily regular headaches. Not interested in prophylactic medications    - States having acid reflux is improved. Stopped pantoprazole. Watching Diet.     - States having numbness or hands - States uncertain carpal tunnel.     - states has weight loss. States 20 lbs in last 5 months. Health Maintenance   - immunizations:   Influenza Vaccination - declines   Pneumonia Vaccination  Zoster/Shingles Vaccine  Tetanus Vaccination- ?    - Screenings:   Bone Density Scan- (12/7/2017) - osteopenia, (2018) osteopenia spine tscore (-2.4)  Pelvic/Pap Exam - (10/2018) - gyn   Mammogram - (6/1/2017), mastectomy     Colonoscopy     US breast - (5/2017) - benign nodule    ROS:  Review of Systems   Constitutional: Negative for appetite change, chills, fever and unexpected weight change. HENT: Negative for congestion, rhinorrhea and sore throat. Eyes: Negative for pain and visual disturbance. Respiratory: Negative for cough, chest tightness and shortness of breath. Cardiovascular: Negative for chest pain and palpitations. Gastrointestinal: Positive for diarrhea. Negative for abdominal pain, blood in stool, constipation, nausea and vomiting. Genitourinary: Negative for difficulty urinating, dysuria, frequency, pelvic pain, urgency and vaginal bleeding. Musculoskeletal: Negative for arthralgias and myalgias. Skin: Negative for rash. Neurological: Positive for headaches. Negative for dizziness, syncope, weakness, light-headedness and numbness. Hematological: Negative for adenopathy. Psychiatric/Behavioral: Negative for suicidal ideas. The patient is not nervous/anxious.           Current Outpatient Medications:     oseltamivir (TAMIFLU) 75 MG capsule, Take 1 capsule by mouth 2 times daily for 5 days, Disp: 10 capsule, Rfl: 0    amoxicillin (AMOXIL) 500 MG capsule, Take 1 capsule by mouth 3 times daily for 10 days, Disp: 30 capsule, Rfl: 0    Cholecalciferol (VITAMIN D3 PO), Take 2,000 Units by mouth daily, Disp: , Rfl:     DULoxetine (CYMBALTA) 20 MG extended release capsule, Take 1 capsule by mouth daily, Disp: 30 capsule, Rfl: 5    Multiple Vitamins-Minerals (THERAPEUTIC MULTIVITAMIN-MINERALS) tablet, Take 1 tablet by mouth nightly LD 2/23/16, Disp: , Rfl:     No Known Allergies    Past Medical History:   Diagnosis Date    Back pain     Cancer ([facility] [address] 7/2014    breast MCCLAIN    Gastroesophageal reflux disease without esophagitis 5/10/2019    Generalized anxiety disorder 5/10/2019    Migraine without aura and without status migrainosus, not intractable 5/10/2019    Mixed hyperlipidemia 5/10/2019    Osteopenia of multiple sites 5/10/2019    Other insomnia 5/10/2019    Other osteoporosis without current pathological fracture 5/10/2019    Peripheral autonomic neuropathy in disorders classified elsewhere 5/10/2019    Recurrent oral aphthae 5/10/2019       Past Surgical History:   Procedure Laterality Date    BREAST SURGERY Bilateral 8/2014    Mastectomy    BREAST SURGERY Bilateral 12/2014    Implant    EYE SURGERY Bilateral     lasik    FRACTURE SURGERY Right 2015    humerus, open reduction fixation shoulder    FRACTURE SURGERY  02/2016    removal of hardware    HYSTERECTOMY      MASTECTOMY Bilateral 8/19/2014    INSERTION OF TISSUE EXPANDERS    SHOULDER SURGERY Right 1/2015    WISDOM TOOTH EXTRACTION         Family History   Problem Relation Age of Onset    Cancer Paternal Grandmother 80        breast     Cancer Paternal Grandfather 79        prostate       Social History     Socioeconomic History    Marital status:      Spouse name: Not on file    Number of children: Not on file    Years of education: Not on file    Highest education level: Not on file   Occupational History    Not on file   Social Needs    Financial resource strain: Not on file    Food insecurity:     Worry: Not on file     Inability: Not on file    Transportation needs:     Medical: Not on file Non-medical: Not on file   Tobacco Use    Smoking status: Former Smoker     Last attempt to quit: 2/3/2000     Years since quittin.9    Smokeless tobacco: Never Used   Substance and Sexual Activity    Alcohol use: No    Drug use: No    Sexual activity: Not on file   Lifestyle    Physical activity:     Days per week: Not on file     Minutes per session: Not on file    Stress: Not on file   Relationships    Social connections:     Talks on phone: Not on file     Gets together: Not on file     Attends Sabianist service: Not on file     Active member of club or organization: Not on file     Attends meetings of clubs or organizations: Not on file     Relationship status: Not on file    Intimate partner violence:     Fear of current or ex partner: Not on file     Emotionally abused: Not on file     Physically abused: Not on file     Forced sexual activity: Not on file   Other Topics Concern    Not on file   Social History Narrative    Not on file       Vitals:    20 0911   BP: 120/84   Site: Left Upper Arm   Cuff Size: Medium Adult   Pulse: 71   Temp: 98.8 °F (37.1 °C)   TempSrc: Temporal   SpO2: 94%   Weight: 165 lb 6 oz (75 kg)   Height: 5' 2\" (1.575 m)       Exam:  Physical Exam  Constitutional:       Appearance: She is well-developed. HENT:      Head: Normocephalic and atraumatic. Right Ear: External ear normal.      Left Ear: External ear normal.      Nose: Congestion and rhinorrhea present. Mouth/Throat:      Comments: Hoarseness   Eyes:      Pupils: Pupils are equal, round, and reactive to light. Neck:      Musculoskeletal: Normal range of motion and neck supple. Thyroid: No thyromegaly. Cardiovascular:      Rate and Rhythm: Normal rate and regular rhythm. Heart sounds: Normal heart sounds. No murmur. Pulmonary:      Effort: Pulmonary effort is normal.      Breath sounds: Normal breath sounds. No wheezing.    Abdominal:      General: Bowel sounds are normal. There is no distension. Palpations: Abdomen is soft. Tenderness: There is no tenderness. There is no guarding or rebound. Musculoskeletal: Normal range of motion. Lymphadenopathy:      Cervical: No cervical adenopathy. Skin:     General: Skin is warm and dry. Neurological:      Mental Status: She is alert and oriented to person, place, and time. Cranial Nerves: No cranial nerve deficit.    Psychiatric:         Judgment: Judgment normal.         Assessment and Plan:    Diagnoses and all orders for this visit:    Influenza  - continue present treatment   - complete tamiflu and amoxicillin   - depo medrol x 1   - add flonase   - zyrtec or claritin     Malignant neoplasm of lower-outer quadrant of left female breast, unspecified estrogen receptor status ([facility] [address]  - continue present treatment   - s/p bilateral mastectomy   - following with oncology  - off arimadex  - off letrozole (2019) - was treated for 5 years   - stable     Migraine without aura and without status migrainosus, not intractable  - on excedrin  - discussed prophylactic meds - declines   - stopped topamax - does not think is helping  - stable  - headache 4 per week - migraine headache 2/month    Generalized anxiety disorder  - off venlafaxine  - off prozac  - on Cymbalta 20mg  - Stable     Vasomotor symptoms due to menopause   - continue present treatment  - off prozac  - on calcium and vitamin D  - on gabapentin   - on cymbalta     Other insomnia  - continue present treatment  - observe  - off meds  - discussed benadryl  - on melatonin    Osteopenia of multiple sites  - Continue present treatment   - on prolia every 6 months   - last DEXA 2018     Vitamin D deficiency  - Continue present treatment   - on otc supplement   - follow labs     Peripheral autonomic neuropathy in disorders classified elsewhere  - continue present treatment  - on Cymbalta  - on gabapentin    Mixed hyperlipidemia  - Continue present treatment   - watch diet   - may

## 2020-01-25 ENCOUNTER — OFFICE VISIT (OUTPATIENT)
Dept: FAMILY MEDICINE CLINIC | Age: 50
End: 2020-01-25
Payer: COMMERCIAL

## 2020-01-25 VITALS
SYSTOLIC BLOOD PRESSURE: 103 MMHG | WEIGHT: 167 LBS | HEART RATE: 83 BPM | OXYGEN SATURATION: 95 % | DIASTOLIC BLOOD PRESSURE: 68 MMHG | BODY MASS INDEX: 30.73 KG/M2 | TEMPERATURE: 97.4 F | HEIGHT: 62 IN

## 2020-01-25 LAB — S PYO AG THROAT QL: NORMAL

## 2020-01-25 PROCEDURE — 99213 OFFICE O/P EST LOW 20 MIN: CPT | Performed by: INTERNAL MEDICINE

## 2020-01-25 PROCEDURE — 87880 STREP A ASSAY W/OPTIC: CPT | Performed by: INTERNAL MEDICINE

## 2020-01-25 PROCEDURE — 96372 THER/PROPH/DIAG INJ SC/IM: CPT | Performed by: INTERNAL MEDICINE

## 2020-01-25 RX ORDER — CEFTRIAXONE 500 MG/1
500 INJECTION, POWDER, FOR SOLUTION INTRAMUSCULAR; INTRAVENOUS ONCE
Status: COMPLETED | OUTPATIENT
Start: 2020-01-25 | End: 2020-01-25

## 2020-01-25 RX ORDER — PREDNISONE 10 MG/1
TABLET ORAL
Qty: 12 TABLET | Refills: 0 | Status: SHIPPED | OUTPATIENT
Start: 2020-01-25 | End: 2020-01-31

## 2020-01-25 RX ORDER — CEFTRIAXONE 500 MG/1
500 INJECTION, POWDER, FOR SOLUTION INTRAMUSCULAR; INTRAVENOUS ONCE
Qty: 500 MG | Refills: 0
Start: 2020-01-25 | End: 2020-01-25

## 2020-01-25 RX ORDER — AMOXICILLIN AND CLAVULANATE POTASSIUM 875; 125 MG/1; MG/1
1 TABLET, FILM COATED ORAL 2 TIMES DAILY
Qty: 20 TABLET | Refills: 0 | Status: SHIPPED | OUTPATIENT
Start: 2020-01-25 | End: 2020-02-04

## 2020-01-25 RX ADMIN — CEFTRIAXONE 500 MG: 500 INJECTION, POWDER, FOR SOLUTION INTRAMUSCULAR; INTRAVENOUS at 10:07

## 2020-01-26 ASSESSMENT — ENCOUNTER SYMPTOMS
WHEEZING: 0
SHORTNESS OF BREATH: 0
ABDOMINAL PAIN: 0
SINUS PRESSURE: 1
SINUS PAIN: 0
CHEST TIGHTNESS: 0
SORE THROAT: 1
COUGH: 0
EYES NEGATIVE: 1

## 2020-01-26 NOTE — PROGRESS NOTES
disease without esophagitis 5/10/2019    Generalized anxiety disorder 5/10/2019    Migraine without aura and without status migrainosus, not intractable 5/10/2019    Mixed hyperlipidemia 5/10/2019    Osteopenia of multiple sites 5/10/2019    Other insomnia 5/10/2019    Other osteoporosis without current pathological fracture 5/10/2019    Peripheral autonomic neuropathy in disorders classified elsewhere 5/10/2019    Recurrent oral aphthae 5/10/2019     Past Surgical History:   Procedure Laterality Date    BREAST SURGERY Bilateral 2014    Mastectomy    BREAST SURGERY Bilateral 2014    Implant    EYE SURGERY Bilateral     lasik    FRACTURE SURGERY Right     humerus, open reduction fixation shoulder    FRACTURE SURGERY  2016    removal of hardware    HYSTERECTOMY      MASTECTOMY Bilateral 2014    INSERTION OF TISSUE EXPANDERS    SHOULDER SURGERY Right 2015    WISDOM TOOTH EXTRACTION       Family History   Problem Relation Age of Onset    Cancer Paternal Grandmother 80        breast     Cancer Paternal Grandfather 79        prostate     Social History     Socioeconomic History    Marital status:      Spouse name: Not on file    Number of children: Not on file    Years of education: Not on file    Highest education level: Not on file   Occupational History    Not on file   Social Needs    Financial resource strain: Not on file    Food insecurity:     Worry: Not on file     Inability: Not on file    Transportation needs:     Medical: Not on file     Non-medical: Not on file   Tobacco Use    Smoking status: Former Smoker     Last attempt to quit: 2/3/2000     Years since quittin.9    Smokeless tobacco: Never Used   Substance and Sexual Activity    Alcohol use: No    Drug use: No    Sexual activity: Not on file   Lifestyle    Physical activity:     Days per week: Not on file     Minutes per session: Not on file    Stress: Not on file   Relationships    Social Mood normal.         Behavior: Behavior normal.         Assessment and Plan:  Arabella was seen today for pharyngitis, otalgia and headache. Diagnoses and all orders for this visit:    Throat pain  -     POCT rapid strep A    Earache symptoms in both ears    Fatigue, unspecified type    Other orders  -     amoxicillin-clavulanate (AUGMENTIN) 875-125 MG per tablet; Take 1 tablet by mouth 2 times daily for 10 days  -     predniSONE (DELTASONE) 10 MG tablet; Take 3 tablets by mouth daily for 2 days, THEN 2 tablets daily for 2 days, THEN 1 tablet daily for 2 days. -     Discontinue: cefTRIAXone (ROCEPHIN) 500 MG injection; Inject 500 mg into the muscle once for 1 dose  -     cefTRIAXone (ROCEPHIN) injection 500 mg    Plan: Shot of Rocephin. Augmentin, prednisone taper dose. Warned of potential side effects. Probiotic. Push fluids. Follow-up with PCP in the next week. The hospital over the rest of the weekend if any worsening. Notify us with problems in the interim. Return in about 1 week (around 2/1/2020). Seen By:  Madhav Sorto MD      *Document was created using voice recognition software. Note was reviewed however may contain grammatical errors.

## 2020-05-01 ENCOUNTER — VIRTUAL VISIT (OUTPATIENT)
Dept: PRIMARY CARE CLINIC | Age: 50
End: 2020-05-01
Payer: COMMERCIAL

## 2020-05-01 PROCEDURE — 99213 OFFICE O/P EST LOW 20 MIN: CPT | Performed by: INTERNAL MEDICINE

## 2020-05-01 RX ORDER — DULOXETIN HYDROCHLORIDE 20 MG/1
20 CAPSULE, DELAYED RELEASE ORAL DAILY
Qty: 30 CAPSULE | Refills: 5 | Status: SHIPPED
Start: 2020-05-01 | End: 2020-10-28 | Stop reason: SDUPTHER

## 2020-05-01 ASSESSMENT — ENCOUNTER SYMPTOMS
SHORTNESS OF BREATH: 0
VOMITING: 0
CHEST TIGHTNESS: 0
CONSTIPATION: 0
COUGH: 0
EYE PAIN: 0
RHINORRHEA: 0
BLOOD IN STOOL: 0
NAUSEA: 0
DIARRHEA: 0
SORE THROAT: 0
ABDOMINAL PAIN: 0

## 2020-05-01 ASSESSMENT — PATIENT HEALTH QUESTIONNAIRE - PHQ9
SUM OF ALL RESPONSES TO PHQ9 QUESTIONS 1 & 2: 2
SUM OF ALL RESPONSES TO PHQ QUESTIONS 1-9: 2
SUM OF ALL RESPONSES TO PHQ QUESTIONS 1-9: 2
1. LITTLE INTEREST OR PLEASURE IN DOING THINGS: 1
2. FEELING DOWN, DEPRESSED OR HOPELESS: 1

## 2020-10-28 ENCOUNTER — OFFICE VISIT (OUTPATIENT)
Dept: FAMILY MEDICINE CLINIC | Age: 50
End: 2020-10-28
Payer: COMMERCIAL

## 2020-10-28 VITALS
WEIGHT: 167.25 LBS | OXYGEN SATURATION: 98 % | BODY MASS INDEX: 30.78 KG/M2 | HEART RATE: 66 BPM | TEMPERATURE: 97.5 F | HEIGHT: 62 IN

## 2020-10-28 PROCEDURE — 99213 OFFICE O/P EST LOW 20 MIN: CPT | Performed by: INTERNAL MEDICINE

## 2020-10-28 PROCEDURE — 90686 IIV4 VACC NO PRSV 0.5 ML IM: CPT | Performed by: INTERNAL MEDICINE

## 2020-10-28 PROCEDURE — 90471 IMMUNIZATION ADMIN: CPT | Performed by: INTERNAL MEDICINE

## 2020-10-28 RX ORDER — DULOXETIN HYDROCHLORIDE 20 MG/1
20 CAPSULE, DELAYED RELEASE ORAL DAILY
Qty: 30 CAPSULE | Refills: 0 | Status: SHIPPED
Start: 2020-10-28 | End: 2021-04-21 | Stop reason: ALTCHOICE

## 2020-10-28 ASSESSMENT — ENCOUNTER SYMPTOMS
SHORTNESS OF BREATH: 0
VOMITING: 0
CHEST TIGHTNESS: 0
NAUSEA: 0
COUGH: 0
RHINORRHEA: 0
EYE PAIN: 0
ABDOMINAL PAIN: 0
SORE THROAT: 0
DIARRHEA: 1
BLOOD IN STOOL: 0
CONSTIPATION: 0

## 2020-10-28 NOTE — PROGRESS NOTES
[facility]) Physicians   Internal Medicine     10/28/2020  Arabella Jackson : 1970 Sex: female  Age:50 y.o. Chief Complaint   Patient presents with    6 Month Follow-Up    Anxiety        HPI:   Patient presents to office for review and management of chronic medical conditions.    - States has been having generalized pruritus. Taking benadryl nightly. Stable. - States had bone scan for bone density. States improved bone density. States osteopenia. States stopped Prolia  (last 2019). Last bone density was 2019.     - States right humeral fracture. Hardware removed. Previous surgery was ORIF with plates and screws. States arm feels better. Still with decreased strength. States decreased ROM. Stable. States numbness and tingling in hands. - States still with hot flashes and anxiety. Hot flashes are less often. Stable. - States still having trouble sleeping. Up and down. Off medications. taking melatonin and taking benadryl at bedtime.     - States to follow with oncology. New oncologist is Dr. Roxana Fay. Remission. States oncology stopped Arimidex, Stopped  letrozole was on for total of 5 years. - States chronic headaches. States has migraines. States weaned off Topamax. States worsened recently. almost daily regular headaches. Not interested in prophylactic medications    - States having acid reflux is improved. Stopped pantoprazole. Watching Diet.     - States depression and anxiety. States on cymbalta. States thinks medication is helping. No suicidal thoughts.     - States having numbness or hands - States uncertain carpal tunnel. Health Maintenance   - immunizations:   Influenza Vaccination - ()   Pneumonia Vaccination  Zoster/Shingles Vaccine  Tetanus Vaccination- ?    - Screenings:   Bone Density Scan- (2017) - osteopenia, (2018) osteopenia spine tscore (-2.4), (2019) - 1.  Findings of osteopenia in the lumbar spine with no statistically significant change, tscore (-1.5) spine, (-0.3) right hip     Pelvic/Pap Exam - (10/2018) - gyn   Mammogram - (6/1/2017), mastectomy     Colonoscopy     US breast - (5/2017) - benign nodule    ROS:  Review of Systems   Constitutional: Negative for appetite change, chills, fever and unexpected weight change. HENT: Negative for congestion, rhinorrhea and sore throat. Eyes: Negative for pain and visual disturbance. Respiratory: Negative for cough, chest tightness and shortness of breath. Cardiovascular: Negative for chest pain and palpitations. Gastrointestinal: Positive for diarrhea. Negative for abdominal pain, blood in stool, constipation, nausea and vomiting. Genitourinary: Negative for difficulty urinating, dysuria, frequency, pelvic pain, urgency and vaginal bleeding. Musculoskeletal: Negative for arthralgias and myalgias. Skin: Negative for rash. Neurological: Positive for headaches. Negative for dizziness, syncope, weakness, light-headedness and numbness. Hematological: Negative for adenopathy. Psychiatric/Behavioral: Negative for suicidal ideas. The patient is not nervous/anxious.           Current Outpatient Medications:     DULoxetine (CYMBALTA) 20 MG extended release capsule, Take 1 capsule by mouth daily, Disp: 30 capsule, Rfl: 0    Cholecalciferol (VITAMIN D3 PO), Take 2,000 Units by mouth daily, Disp: , Rfl:     Multiple Vitamins-Minerals (THERAPEUTIC MULTIVITAMIN-MINERALS) tablet, Take 1 tablet by mouth nightly LD 2/23/16, Disp: , Rfl:     No Known Allergies    Past Medical History:   Diagnosis Date    Back pain     Cancer ([facility] [place] [address] 7/2014    breast MCCLAIN    Gastroesophageal reflux disease without esophagitis 5/10/2019    Generalized anxiety disorder 5/10/2019    Migraine without aura and without status migrainosus, not intractable 5/10/2019    Mixed hyperlipidemia 5/10/2019    Osteopenia of multiple sites 5/10/2019    Other insomnia 5/10/2019    Other osteoporosis without current of current or ex partner: Not on file     Emotionally abused: Not on file     Physically abused: Not on file     Forced sexual activity: Not on file   Other Topics Concern    Not on file   Social History Narrative    Not on file       Vitals:    10/28/20 1549   Pulse: 66   Temp: 97.5 °F (36.4 °C)   SpO2: 98%   Weight: 167 lb 4 oz (75.9 kg)   Height: 5' 2\" (1.575 m)       Exam:  Physical Exam  Constitutional:       Appearance: She is well-developed. HENT:      Head: Normocephalic and atraumatic. Right Ear: External ear normal.      Left Ear: External ear normal.      Nose: Congestion and rhinorrhea present. Mouth/Throat:      Comments: Hoarseness   Eyes:      Pupils: Pupils are equal, round, and reactive to light. Neck:      Musculoskeletal: Normal range of motion and neck supple. Thyroid: No thyromegaly. Cardiovascular:      Rate and Rhythm: Normal rate and regular rhythm. Heart sounds: Normal heart sounds. No murmur. Pulmonary:      Effort: Pulmonary effort is normal.      Breath sounds: Normal breath sounds. No wheezing. Abdominal:      General: Bowel sounds are normal. There is no distension. Palpations: Abdomen is soft. Tenderness: There is no abdominal tenderness. There is no guarding or rebound. Musculoskeletal: Normal range of motion. Lymphadenopathy:      Cervical: No cervical adenopathy. Skin:     General: Skin is warm and dry. Neurological:      Mental Status: She is alert and oriented to person, place, and time. Cranial Nerves: No cranial nerve deficit.    Psychiatric:         Judgment: Judgment normal.         Assessment and Plan:    Diagnoses and all orders for this visit:    Malignant neoplasm of lower-outer quadrant of left female breast, unspecified estrogen receptor status ([facility] [address]  - continue present treatment   - s/p bilateral mastectomy   - no longer following with oncology  - off arimadex  - off letrozole (2019) - was treated for 5 years   - stable     Migraine without aura and without status migrainosus, not intractable  - on excedrin  - discussed prophylactic meds - declines   - stopped topamax - does not think is helping  - headache 4 per week - migraine headache 2/month  - declines medication     Generalized anxiety disorder  - off venlafaxine  - off prozac  - on Cymbalta 20mg - would like to wean   - Stable     Vasomotor symptoms due to menopause   - continue present treatment  - off prozac  - on calcium and vitamin D  - off gabapentin   - on cymbalta - would lliek to wean off     Other insomnia  - continue present treatment  - observe  - off meds  - discussed benadryl  - on melatonin    Osteopenia of multiple sites  - Continue present treatment   - on prolia every 6 months   - last DEXA 12/2019    Vitamin D deficiency  - Continue present treatment   - on otc supplement   - follow labs     Peripheral autonomic neuropathy in disorders classified elsewhere  - continue present treatment  - on Cymbalta - would like to wean off medications  - off gabapentin    Mixed hyperlipidemia  - Continue present treatment   - watch diet   - may need to consider meds, calculate 10year risk with next blood work     Gastroesophageal reflux disease without esophagitis  - Continue present treatment   - watch diet   - off pantoprazole   - stable     Genital pruritus  - On benadryl as needed     Recurrent oral aphthae  - continue present treatment  - uncertain as to cause  - uncertain if side effects of meds  - Magic Mouthwash swish and swallow 1tsp q4hr as needed     Return in about 6 months (around 4/28/2021) for check up and review.     Orders Placed This Encounter   Procedures    INFLUENZA, QUADV, 3 YRS AND OLDER, IM PF, PREFILL SYR OR SDV, 0.5ML (AFLURIA QUADV, PF)    Comprehensive Metabolic Panel     Standing Status:   Future     Standing Expiration Date:   10/28/2021    Magnesium     Standing Status:   Future     Standing Expiration Date:   10/28/2021    Lipid, Fasting     Standing Status:   Future     Standing Expiration Date:   10/28/2021    Vitamin D 25 Hydroxy     Standing Status:   Future     Standing Expiration Date:   10/28/2021    TSH without Reflex     Standing Status:   Future     Standing Expiration Date:   10/28/2021    Urinalysis     Standing Status:   Future     Standing Expiration Date:   10/28/2021    CBC Auto Differential     Standing Status:   Future     Standing Expiration Date:   10/28/2021     Requested Prescriptions     Signed Prescriptions Disp Refills    DULoxetine (CYMBALTA) 20 MG extended release capsule 30 capsule 0     Sig: Take 1 capsule by mouth daily         Glen Hoff MD  10/28/2020  5:02 PM

## 2020-12-30 DIAGNOSIS — E55.9 VITAMIN D DEFICIENCY: ICD-10-CM

## 2020-12-30 DIAGNOSIS — E78.2 MIXED HYPERLIPIDEMIA: ICD-10-CM

## 2020-12-30 DIAGNOSIS — R53.83 FATIGUE, UNSPECIFIED TYPE: ICD-10-CM

## 2020-12-30 LAB
ALBUMIN SERPL-MCNC: 4.7 G/DL (ref 3.5–5.2)
ALP BLD-CCNC: 70 U/L (ref 35–104)
ALT SERPL-CCNC: 15 U/L (ref 0–32)
ANION GAP SERPL CALCULATED.3IONS-SCNC: 15 MMOL/L (ref 7–16)
AST SERPL-CCNC: 17 U/L (ref 0–31)
BASOPHILS ABSOLUTE: 0.03 E9/L (ref 0–0.2)
BASOPHILS RELATIVE PERCENT: 0.5 % (ref 0–2)
BILIRUB SERPL-MCNC: 0.3 MG/DL (ref 0–1.2)
BILIRUBIN URINE: NEGATIVE
BLOOD, URINE: NEGATIVE
BUN BLDV-MCNC: 14 MG/DL (ref 6–20)
CALCIUM SERPL-MCNC: 10 MG/DL (ref 8.6–10.2)
CHLORIDE BLD-SCNC: 103 MMOL/L (ref 98–107)
CHOLESTEROL, FASTING: 220 MG/DL (ref 0–199)
CLARITY: CLEAR
CO2: 25 MMOL/L (ref 22–29)
COLOR: YELLOW
CREAT SERPL-MCNC: 0.8 MG/DL (ref 0.5–1)
EOSINOPHILS ABSOLUTE: 0.2 E9/L (ref 0.05–0.5)
EOSINOPHILS RELATIVE PERCENT: 3 % (ref 0–6)
GFR AFRICAN AMERICAN: >60
GFR NON-AFRICAN AMERICAN: >60 ML/MIN/1.73
GLUCOSE BLD-MCNC: 73 MG/DL (ref 74–99)
GLUCOSE URINE: NEGATIVE MG/DL
HCT VFR BLD CALC: 41 % (ref 34–48)
HDLC SERPL-MCNC: 41 MG/DL
HEMOGLOBIN: 13.1 G/DL (ref 11.5–15.5)
IMMATURE GRANULOCYTES #: 0.01 E9/L
IMMATURE GRANULOCYTES %: 0.2 % (ref 0–5)
KETONES, URINE: NEGATIVE MG/DL
LDL CHOLESTEROL CALCULATED: 134 MG/DL (ref 0–99)
LEUKOCYTE ESTERASE, URINE: NEGATIVE
LYMPHOCYTES ABSOLUTE: 2.37 E9/L (ref 1.5–4)
LYMPHOCYTES RELATIVE PERCENT: 35.7 % (ref 20–42)
MAGNESIUM: 2.4 MG/DL (ref 1.6–2.6)
MCH RBC QN AUTO: 29.9 PG (ref 26–35)
MCHC RBC AUTO-ENTMCNC: 32 % (ref 32–34.5)
MCV RBC AUTO: 93.6 FL (ref 80–99.9)
MONOCYTES ABSOLUTE: 0.3 E9/L (ref 0.1–0.95)
MONOCYTES RELATIVE PERCENT: 4.5 % (ref 2–12)
NEUTROPHILS ABSOLUTE: 3.73 E9/L (ref 1.8–7.3)
NEUTROPHILS RELATIVE PERCENT: 56.1 % (ref 43–80)
NITRITE, URINE: NEGATIVE
PDW BLD-RTO: 13.2 FL (ref 11.5–15)
PH UA: 6 (ref 5–9)
PLATELET # BLD: 303 E9/L (ref 130–450)
PMV BLD AUTO: 11.9 FL (ref 7–12)
POTASSIUM SERPL-SCNC: 4.1 MMOL/L (ref 3.5–5)
PROTEIN UA: NEGATIVE MG/DL
RBC # BLD: 4.38 E12/L (ref 3.5–5.5)
SODIUM BLD-SCNC: 143 MMOL/L (ref 132–146)
SPECIFIC GRAVITY UA: 1.02 (ref 1–1.03)
TOTAL PROTEIN: 7.4 G/DL (ref 6.4–8.3)
TRIGLYCERIDE, FASTING: 225 MG/DL (ref 0–149)
TSH SERPL DL<=0.05 MIU/L-ACNC: 1.42 UIU/ML (ref 0.27–4.2)
UROBILINOGEN, URINE: 0.2 E.U./DL
VITAMIN D 25-HYDROXY: 40 NG/ML (ref 30–100)
VLDLC SERPL CALC-MCNC: 45 MG/DL
WBC # BLD: 6.6 E9/L (ref 4.5–11.5)

## 2021-01-02 ENCOUNTER — TELEPHONE (OUTPATIENT)
Dept: FAMILY MEDICINE CLINIC | Age: 51
End: 2021-01-02

## 2021-01-02 NOTE — LETTER
[id number] [address]  Phone: [phone]  Fax: [phone]    Gigi Mary MD        January 4, 2021     [address] [id number]      Dear Abdullahi Gallagher:    Below are the results from your recent visit:    Resulted Orders   CBC Auto Differential   Result Value Ref Range    WBC 6.6 4.5 - 11.5 E9/L    RBC 4.38 3.50 - 5.50 E12/L    Hemoglobin 13.1 11.5 - 15.5 g/dL    Hematocrit 41.0 34.0 - 48.0 %    MCV 93.6 80.0 - 99.9 fL    MCH 29.9 26.0 - 35.0 pg    MCHC 32.0 32.0 - 34.5 %    RDW 13.2 11.5 - 15.0 fL    Platelets 563 115 - 561 E9/L    MPV 11.9 7.0 - 12.0 fL    Neutrophils % 56.1 43.0 - 80.0 %    Immature Granulocytes % 0.2 0.0 - 5.0 %    Lymphocytes % 35.7 20.0 - 42.0 %    Monocytes % 4.5 2.0 - 12.0 %    Eosinophils % 3.0 0.0 - 6.0 %    Basophils % 0.5 0.0 - 2.0 %    Neutrophils Absolute 3.73 1.80 - 7.30 E9/L    Immature Granulocytes # 0.01 E9/L    Lymphocytes Absolute 2.37 1.50 - 4.00 E9/L    Monocytes Absolute 0.30 0.10 - 0.95 E9/L    Eosinophils Absolute 0.20 0.05 - 0.50 E9/L    Basophils Absolute 0.03 0.00 - 0.20 E9/L   Urinalysis   Result Value Ref Range    Color, UA Yellow Straw/Yellow    Clarity, UA Clear Clear    Glucose, Ur Negative Negative mg/dL    Bilirubin Urine Negative Negative    Ketones, Urine Negative Negative mg/dL    Specific Gravity, UA 1.020 1.005 - 1.030    Blood, Urine Negative Negative    pH, UA 6.0 5.0 - 9.0    Protein, UA Negative Negative mg/dL    Urobilinogen, Urine 0.2 <2.0 E.U./dL    Nitrite, Urine Negative Negative    Leukocyte Esterase, Urine Negative Negative   TSH without Reflex   Result Value Ref Range    TSH 1.420 0.270 - 4.200 uIU/mL   Vitamin D 25 Hydroxy   Result Value Ref Range    Vit D, 25-Hydroxy 40 30 - 100 ng/mL      Comment:      <20 ng/mL. ........... Nimisha Phelps Deficient  20-30 ng/mL. ......... Nimisha Phelps Insufficient   ng/mL. ........ Nimisha Phelps Sufficient  >100 ng/mL. .......... Nimisha Phelps Toxic Lipid, Fasting   Result Value Ref Range    Cholesterol, Fasting 220 (H) 0 - 199 mg/dL    Triglyceride, Fasting 225 (H) 0 - 149 mg/dL    HDL 41 >40 mg/dL    LDL Calculated 134 (H) 0 - 99 mg/dL    VLDL Cholesterol Calculated 45 mg/dL   Magnesium   Result Value Ref Range    Magnesium 2.4 1.6 - 2.6 mg/dL   Comprehensive Metabolic Panel   Result Value Ref Range    Sodium 143 132 - 146 mmol/L    Potassium 4.1 3.5 - 5.0 mmol/L    Chloride 103 98 - 107 mmol/L    CO2 25 22 - 29 mmol/L    Anion Gap 15 7 - 16 mmol/L    Glucose 73 (L) 74 - 99 mg/dL    BUN 14 6 - 20 mg/dL    CREATININE 0.8 0.5 - 1.0 mg/dL    GFR Non-African American >60 >=60 mL/min/1.73      Comment:      Chronic Kidney Disease: less than 60 ml/min/1.73 sq.m. Kidney Failure: less than 15 ml/min/1.73 sq.m. Results valid for patients 18 years and older. GFR  >60     Calcium 10.0 8.6 - 10.2 mg/dL    Total Protein 7.4 6.4 - 8.3 g/dL    Alb 4.7 3.5 - 5.2 g/dL    Total Bilirubin 0.3 0.0 - 1.2 mg/dL    Alkaline Phosphatase 70 35 - 104 U/L    ALT 15 0 - 32 U/L    AST 17 0 - 31 U/L                         The test results show:    Cholesterol levels were elevated. The 10-year ASCVD risk score (José Miguel Kiran., et al., 2013) is: 1.3%, which is considered lower risk for heart vascular complications in the next 10 years. Recommend to continue to watch diet and exercise     Vitamin D levels were back in the normal range and improved when compared to previous     Thyroid levels were normal     Urinalysis was normal     Electrolytes, liver, and kidney function values were normal     Blood counts were normal  If you have any questions or concerns, please don't hesitate to call.     Sincerely,        Marry Trejo MD

## 2021-01-02 NOTE — TELEPHONE ENCOUNTER
Please let the patient know that blood work results showed    Cholesterol levels were elevated. The 10-year ASCVD risk score (Katie Eller, et al., 2013) is: 1.3%, which is considered lower risk for heart vascular complications in the next 10 years.   Recommend to continue to watch diet and exercise    Vitamin D levels were back in the normal range and improved when compared to previous    Thyroid levels were normal    Urinalysis was normal    Electrolytes, liver, and kidney function values were normal    Blood counts were normal    Please send a copy of the records the patient    Thanks

## 2021-04-21 ENCOUNTER — OFFICE VISIT (OUTPATIENT)
Dept: FAMILY MEDICINE CLINIC | Age: 51
End: 2021-04-21
Payer: COMMERCIAL

## 2021-04-21 VITALS
SYSTOLIC BLOOD PRESSURE: 92 MMHG | OXYGEN SATURATION: 98 % | TEMPERATURE: 97.5 F | WEIGHT: 147.25 LBS | BODY MASS INDEX: 27.1 KG/M2 | HEART RATE: 69 BPM | DIASTOLIC BLOOD PRESSURE: 64 MMHG | HEIGHT: 62 IN

## 2021-04-21 DIAGNOSIS — G43.009 MIGRAINE WITHOUT AURA AND WITHOUT STATUS MIGRAINOSUS, NOT INTRACTABLE: ICD-10-CM

## 2021-04-21 DIAGNOSIS — E78.2 MIXED HYPERLIPIDEMIA: ICD-10-CM

## 2021-04-21 DIAGNOSIS — K21.9 GASTROESOPHAGEAL REFLUX DISEASE WITHOUT ESOPHAGITIS: ICD-10-CM

## 2021-04-21 DIAGNOSIS — N95.1 VASOMOTOR SYMPTOMS DUE TO MENOPAUSE: ICD-10-CM

## 2021-04-21 DIAGNOSIS — F41.1 GENERALIZED ANXIETY DISORDER: ICD-10-CM

## 2021-04-21 DIAGNOSIS — E55.9 VITAMIN D DEFICIENCY: ICD-10-CM

## 2021-04-21 DIAGNOSIS — G47.09 OTHER INSOMNIA: ICD-10-CM

## 2021-04-21 DIAGNOSIS — L29.3 GENITAL PRURITUS: ICD-10-CM

## 2021-04-21 DIAGNOSIS — M85.89 OSTEOPENIA OF MULTIPLE SITES: ICD-10-CM

## 2021-04-21 DIAGNOSIS — G99.0 PERIPHERAL AUTONOMIC NEUROPATHY IN DISORDERS CLASSIFIED ELSEWHERE: ICD-10-CM

## 2021-04-21 DIAGNOSIS — K12.0 RECURRENT ORAL APHTHAE: ICD-10-CM

## 2021-04-21 DIAGNOSIS — M54.6 ACUTE THORACIC BACK PAIN, UNSPECIFIED BACK PAIN LATERALITY: Primary | ICD-10-CM

## 2021-04-21 PROCEDURE — 99213 OFFICE O/P EST LOW 20 MIN: CPT | Performed by: INTERNAL MEDICINE

## 2021-04-21 RX ORDER — DIPHENHYDRAMINE HCL 25 MG
25 CAPSULE ORAL NIGHTLY PRN
COMMUNITY
End: 2021-10-13 | Stop reason: ALTCHOICE

## 2021-04-21 RX ORDER — CHLORHEXIDINE GLUCONATE 4 %
LIQUID (ML) TOPICAL
COMMUNITY

## 2021-04-21 ASSESSMENT — PATIENT HEALTH QUESTIONNAIRE - PHQ9
1. LITTLE INTEREST OR PLEASURE IN DOING THINGS: 0
SUM OF ALL RESPONSES TO PHQ QUESTIONS 1-9: 0
SUM OF ALL RESPONSES TO PHQ9 QUESTIONS 1 & 2: 0
SUM OF ALL RESPONSES TO PHQ QUESTIONS 1-9: 0
2. FEELING DOWN, DEPRESSED OR HOPELESS: 0

## 2021-04-21 ASSESSMENT — ENCOUNTER SYMPTOMS
SHORTNESS OF BREATH: 0
NAUSEA: 0
ABDOMINAL PAIN: 0
CHEST TIGHTNESS: 0
DIARRHEA: 1
COUGH: 0
EYE PAIN: 0
RHINORRHEA: 0
SORE THROAT: 0
VOMITING: 0
CONSTIPATION: 0
BLOOD IN STOOL: 0

## 2021-04-21 NOTE — PROGRESS NOTES
[facility]) Physicians   Internal Medicine     2021  Arabella Jackson : 1970 Sex: female  Age:50 y.o. Chief Complaint   Patient presents with    6 Month Follow-Up    Anxiety     No longer on Cymbalta         HPI:   Patient presents to office for evaluation of the following medical concerns. - States has been having mid back pain. States feels has if someone is poking with hot fingers. Symptoms fluctuating. States worse with sitting.     - States has lost weight - trying.     - States has been having generalized pruritus. Taking benadryl nightly. Stable. - States had bone scan for bone density. States improved bone density. States osteopenia. States stopped Prolia  (last 2019). Last bone density was 2019.     - States still with hot flashes and anxiety. Hot flashes are less often. Stable. - States has had breast cancer. No longer following with oncology. New oncologist is Dr. Mara King. Remission. States oncology stopped Arimidex, Stopped  letrozole was on for total of 5 years. - States chronic headaches. States has migraines. States weaned off Topamax. States worsened recently. almost daily regular headaches. Not interested in prophylactic medications    - States having acid reflux is improved. Stopped pantoprazole. Watching Diet.    - States having numbness or hands - States uncertain carpal tunnel.     - States right humeral fracture. Hardware removed. Previous surgery was ORIF with plates and screws. States arm feels better. Still with decreased strength. States decreased ROM. Stable. States numbness and tingling in hands. Health Maintenance   - immunizations:   Influenza Vaccination - ()   Pneumonia Vaccination  Zoster/Shingles Vaccine  Tetanus Vaccination- ?  covid (    - Screenings:   Bone Density Scan- (2017) - osteopenia, (2018) osteopenia spine tscore (-2.4), (2019) - 1.  Findings of osteopenia in the lumbar spine with no statistically significant change, tscore (-1.5) spine, (-0.3) right hip     Pelvic/Pap Exam - (10/2018) - gyn   Mammogram - (6/1/2017), mastectomy     Colonoscopy     US breast - (5/2017) - benign nodule    ROS:  Review of Systems   Constitutional: Negative for appetite change, chills, fever and unexpected weight change. HENT: Negative for congestion, rhinorrhea and sore throat. Eyes: Negative for pain and visual disturbance. Respiratory: Negative for cough, chest tightness and shortness of breath. Cardiovascular: Negative for chest pain and palpitations. Gastrointestinal: Positive for diarrhea. Negative for abdominal pain, blood in stool, constipation, nausea and vomiting. Genitourinary: Negative for difficulty urinating, dysuria, frequency, pelvic pain, urgency and vaginal bleeding. Musculoskeletal: Negative for arthralgias and myalgias. Skin: Negative for rash. Neurological: Positive for headaches. Negative for dizziness, syncope, weakness, light-headedness and numbness. Hematological: Negative for adenopathy. Psychiatric/Behavioral: Negative for suicidal ideas. The patient is not nervous/anxious.           Current Outpatient Medications:     Cyanocobalamin (VITAMIN B 12 PO), Take by mouth, Disp: , Rfl:     Calcium Carb-Cholecalciferol (CALCIUM 600+D3 PO), Take by mouth, Disp: , Rfl:     Melatonin 12 MG TABS, Take by mouth, Disp: , Rfl:     diphenhydrAMINE (BENADRYL ALLERGY) 25 MG capsule, Take 25 mg by mouth nightly as needed for Itching, Disp: , Rfl:     Cholecalciferol (VITAMIN D3 PO), Take 2,000 Units by mouth daily, Disp: , Rfl:     Multiple Vitamins-Minerals (THERAPEUTIC MULTIVITAMIN-MINERALS) tablet, Take 1 tablet by mouth nightly LD 2/23/16, Disp: , Rfl:     No Known Allergies    Past Medical History:   Diagnosis Date    Back pain     Cancer ([facility] [address] 7/2014    breast MCCLAIN    Gastroesophageal reflux disease without esophagitis 5/10/2019    Generalized anxiety disorder 5/10/2019    Migraine without aura and without status migrainosus, not intractable 5/10/2019    Mixed hyperlipidemia 5/10/2019    Osteopenia of multiple sites 5/10/2019    Other insomnia 5/10/2019    Other osteoporosis without current pathological fracture 5/10/2019    Peripheral autonomic neuropathy in disorders classified elsewhere 5/10/2019    Recurrent oral aphthae 5/10/2019       Past Surgical History:   Procedure Laterality Date    BREAST SURGERY Bilateral 2014    Mastectomy    BREAST SURGERY Bilateral 2014    Implant    EYE SURGERY Bilateral     lasik    FRACTURE SURGERY Right     humerus, open reduction fixation shoulder    FRACTURE SURGERY  2016    removal of hardware    HYSTERECTOMY      MASTECTOMY Bilateral 2014    INSERTION OF TISSUE EXPANDERS    SHOULDER SURGERY Right 2015    WISDOM TOOTH EXTRACTION         Family History   Problem Relation Age of Onset    Cancer Paternal Grandmother 80        breast     Cancer Paternal Grandfather 79        prostate    Breast Cancer Mother     Colon Cancer Mother     No Known Problems Father        Social History     Socioeconomic History    Marital status:      Spouse name: Not on file    Number of children: Not on file    Years of education: Not on file    Highest education level: Not on file   Occupational History    Not on file   Social Needs    Financial resource strain: Not on file    Food insecurity     Worry: Not on file     Inability: Not on file    Transportation needs     Medical: Not on file     Non-medical: Not on file   Tobacco Use    Smoking status: Former Smoker     Quit date: 2/3/2000     Years since quittin.2    Smokeless tobacco: Never Used   Substance and Sexual Activity    Alcohol use: No    Drug use: No    Sexual activity: Not on file   Lifestyle    Physical activity     Days per week: Not on file     Minutes per session: Not on file    Stress: Not on file   Relationships    Social connections     Talks on Type:   Eval and Treat     Referral Reason:   Specialty Services Required     Referred to Provider:   Melecio Ordaz DC     Requested Specialty:   Chiropractic Medicine     Number of Visits Requested:   1     Requested Prescriptions      No prescriptions requested or ordered in this encounter         Ilsa Landau, MD  4/21/2021  3:34 PM

## 2021-05-05 ENCOUNTER — OFFICE VISIT (OUTPATIENT)
Dept: CHIROPRACTIC MEDICINE | Age: 51
End: 2021-05-05
Payer: COMMERCIAL

## 2021-05-05 VITALS — OXYGEN SATURATION: 98 % | TEMPERATURE: 97.8 F | HEART RATE: 69 BPM | RESPIRATION RATE: 14 BRPM

## 2021-05-05 DIAGNOSIS — G89.29 CHRONIC THORACIC SPINE PAIN: Primary | ICD-10-CM

## 2021-05-05 DIAGNOSIS — R07.81 RIB PAIN ON RIGHT SIDE: ICD-10-CM

## 2021-05-05 DIAGNOSIS — M54.6 CHRONIC THORACIC SPINE PAIN: Primary | ICD-10-CM

## 2021-05-05 PROCEDURE — 99203 OFFICE O/P NEW LOW 30 MIN: CPT | Performed by: CHIROPRACTOR

## 2021-05-05 PROCEDURE — 97014 ELECTRIC STIMULATION THERAPY: CPT | Performed by: CHIROPRACTOR

## 2021-05-05 PROCEDURE — 98940 CHIROPRACT MANJ 1-2 REGIONS: CPT | Performed by: CHIROPRACTOR

## 2021-05-05 ASSESSMENT — ENCOUNTER SYMPTOMS
BACK PAIN: 1
BOWEL INCONTINENCE: 0
SHORTNESS OF BREATH: 0
COUGH: 0

## 2021-05-05 NOTE — PROGRESS NOTES
[facility]    21  Arabella Jackson : 1970 Sex: female  Age: 53 y.o. Patient was referred by Austin Jerez MD    Chief Complaint   Patient presents with    Back Pain     middle back mid-right sided back pain for several months. 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 Has had injuries in past.  Fx left ribs (4) on left. Pain more to right side at bra line, will travel to left too    No injuries at this point. No specific cause to her current symptoms. Breast cancer years ago. Had reconstruction. Was 30 lbs heavier at the time. Wonders if implants and larger chest could be problematic          Back Pain  This is a chronic problem. The current episode started more than 1 month ago. The problem occurs intermittently. The problem is unchanged. The pain is present in the thoracic spine. The quality of the pain is described as aching and stabbing. The pain does not radiate. The pain is at a severity of 6/10. The pain is worse during the day. The symptoms are aggravated by standing and position. Pertinent negatives include no bladder incontinence, bowel incontinence, fever or leg pain. Risk factors include history of cancer. She has tried home exercises and NSAIDs for the symptoms. Red Flags:  history of cancer    Review of Systems   Constitutional: Negative for chills and fever. Respiratory: Negative for cough and shortness of breath. Gastrointestinal: Negative for bowel incontinence. Genitourinary: Negative for bladder incontinence. Musculoskeletal: Positive for back pain.          Current Outpatient Medications:     Cyanocobalamin (VITAMIN B 12 PO), Take by mouth, Disp: , Rfl:     Calcium Carb-Cholecalciferol (CALCIUM 600+D3 PO), Take by mouth, Disp: , Rfl:     Melatonin 12 MG TABS, Take by mouth, Disp: , Rfl:    levator scapulae. Milder degree of tenderness over the left costotransverse joints 8 and 9. There are hypertonic and tender fibers in the thoracic paraspinals today. Joint fixation noted today T9-10    Arabella was seen today for back pain. Diagnoses and all orders for this visit:    Chronic thoracic spine pain    Rib pain on right side        Treatment Plan:   I spoke with her regarding my exam findings and treatment options. I recommended that we start a trial of conservative care today consisting EMS, manipulation and HEP. I will plan on seeing her 1 times per week for 4 weeks, then reassess to determine response to care and future options. With consent, I did begin today. Problem/Goals  Decrease pain and/or swelling and Increase ROM and/or flexibility    Today's Treatment  EMS with heat to the lower thoracic region for 15 minutes to address muscle spasm/hypertension and alleviate pain. Diversified manipulation affected segment. Tolerated well. Also perform some gentle mobilization to the area while in a seated position. Time requirement not met for care. She noted relief following care. Reviewed some self myofascial release techniques for her to do at home using a lacrosse ball. I will be sending her her HEP tonight through the Prehab portal with her consent. I spoke to her regarding posttreatment soreness. Should any arise, she  may use ice for 10-15 minutes, over-the-counter NSAIDs or topical gels. Seen By:  Chico Henry DC    * This note was created using voice recognition software.   The note was reviewed, however grammatical errors may exist.

## 2021-05-12 ENCOUNTER — OFFICE VISIT (OUTPATIENT)
Dept: CHIROPRACTIC MEDICINE | Age: 51
End: 2021-05-12
Payer: COMMERCIAL

## 2021-05-12 VITALS — TEMPERATURE: 97.7 F | OXYGEN SATURATION: 98 % | RESPIRATION RATE: 14 BRPM | HEART RATE: 99 BPM

## 2021-05-12 DIAGNOSIS — R07.81 RIB PAIN ON RIGHT SIDE: ICD-10-CM

## 2021-05-12 DIAGNOSIS — G89.29 CHRONIC THORACIC SPINE PAIN: Primary | ICD-10-CM

## 2021-05-12 DIAGNOSIS — M54.6 CHRONIC THORACIC SPINE PAIN: Primary | ICD-10-CM

## 2021-05-12 PROCEDURE — 97014 ELECTRIC STIMULATION THERAPY: CPT | Performed by: CHIROPRACTOR

## 2021-05-12 PROCEDURE — 99999 PR OFFICE/OUTPT VISIT,PROCEDURE ONLY: CPT | Performed by: CHIROPRACTOR

## 2021-05-12 PROCEDURE — 98940 CHIROPRACT MANJ 1-2 REGIONS: CPT | Performed by: CHIROPRACTOR

## 2021-05-26 ENCOUNTER — OFFICE VISIT (OUTPATIENT)
Dept: CHIROPRACTIC MEDICINE | Age: 51
End: 2021-05-26
Payer: COMMERCIAL

## 2021-05-26 VITALS — HEART RATE: 71 BPM | TEMPERATURE: 97.8 F | OXYGEN SATURATION: 98 % | RESPIRATION RATE: 14 BRPM

## 2021-05-26 DIAGNOSIS — R07.81 RIB PAIN ON RIGHT SIDE: ICD-10-CM

## 2021-05-26 DIAGNOSIS — M54.6 CHRONIC THORACIC SPINE PAIN: Primary | ICD-10-CM

## 2021-05-26 DIAGNOSIS — G89.29 CHRONIC THORACIC SPINE PAIN: Primary | ICD-10-CM

## 2021-05-26 PROCEDURE — 99999 PR OFFICE/OUTPT VISIT,PROCEDURE ONLY: CPT | Performed by: CHIROPRACTOR

## 2021-05-26 PROCEDURE — 97014 ELECTRIC STIMULATION THERAPY: CPT | Performed by: CHIROPRACTOR

## 2021-05-26 PROCEDURE — 98940 CHIROPRACT MANJ 1-2 REGIONS: CPT | Performed by: CHIROPRACTOR

## 2021-05-26 NOTE — PROGRESS NOTES
21  Arabella Jackson : 1970 Sex: female  Age: 49 y.o. Chief Complaint   Patient presents with    Back Pain       HPI:   Pain is has improved. On average, pain is perceived as moderate (4-6 pain scale). Change in quality of symptoms: no.  She denies any other symptoms. Continues with right-sided rib pain. She states is feeling much better, but still there. She has no new issues. She is doing her exercises. She is definitely more aware of when it is aggravated          Current Outpatient Medications:     Cyanocobalamin (VITAMIN B 12 PO), Take by mouth, Disp: , Rfl:     Calcium Carb-Cholecalciferol (CALCIUM 600+D3 PO), Take by mouth, Disp: , Rfl:     Melatonin 12 MG TABS, Take by mouth, Disp: , Rfl:     diphenhydrAMINE (BENADRYL ALLERGY) 25 MG capsule, Take 25 mg by mouth nightly as needed for Itching, Disp: , Rfl:     Cholecalciferol (VITAMIN D3 PO), Take 2,000 Units by mouth daily, Disp: , Rfl:     Multiple Vitamins-Minerals (THERAPEUTIC MULTIVITAMIN-MINERALS) tablet, Take 1 tablet by mouth nightly LD 16, Disp: , Rfl:     Exam:   Vitals:    21 1521   Pulse: 71   Resp: 14   Temp: 97.8 °F (36.6 °C)   SpO2: 98%         There are hypertonic and tender fibers noted today in the bilateral thoracic paraspinal muscles, worse right. Joint fixation is noted with motion screening at T5-6, T8-10. Arabella was seen today for back pain. Diagnoses and all orders for this visit:    Chronic thoracic spine pain    Rib pain on right side        Treatment Plan: Today I  continued with diversified manipulation to the affected thoracic segments. EMS with heat to the thoracic region for 15 minutes to address muscle spasm/hypertension and alleviate pain. Right bias today. She will continue with HEP as previously set forth. She is leaving for vacation.   I will see her in the future as needed for further treatment    Seen By:  Janis Emmanuel

## 2021-09-16 ENCOUNTER — TELEPHONE (OUTPATIENT)
Dept: FAMILY MEDICINE CLINIC | Age: 51
End: 2021-09-16

## 2021-09-16 DIAGNOSIS — E55.9 VITAMIN D DEFICIENCY: ICD-10-CM

## 2021-09-16 DIAGNOSIS — R53.83 FATIGUE, UNSPECIFIED TYPE: ICD-10-CM

## 2021-09-16 DIAGNOSIS — E78.2 MIXED HYPERLIPIDEMIA: Primary | ICD-10-CM

## 2021-09-16 NOTE — TELEPHONE ENCOUNTER
Spoke w/  and let him know that I would forward a message to Dr Martha Lara to place lab order. He was made aware that Dr Omar Butler is out of the office until Monday and that orders will be placed next week. He was also informed that the [facility] will be closed until 9/27/21.

## 2021-09-17 NOTE — TELEPHONE ENCOUNTER
Orders Placed This Encounter   Procedures    Comprehensive Metabolic Panel     Standing Status:   Future     Standing Expiration Date:   9/17/2022    Magnesium     Standing Status:   Future     Standing Expiration Date:   9/17/2022    Lipid, Fasting     Standing Status:   Future     Standing Expiration Date:   9/17/2022    Vitamin D 25 Hydroxy     Standing Status:   Future     Standing Expiration Date:   9/17/2022    TSH without Reflex     Standing Status:   Future     Standing Expiration Date:   9/17/2022    Urinalysis     Standing Status:   Future     Standing Expiration Date:   9/17/2022    CBC Auto Differential     Standing Status:   Future     Standing Expiration Date:   9/17/2022     Orders placed as above

## 2021-09-26 ENCOUNTER — TELEPHONE (OUTPATIENT)
Dept: PRIMARY CARE CLINIC | Age: 51
End: 2021-09-26

## 2021-09-26 NOTE — LETTER
[facility]  [address] [place] [address] Darius MIRANDA [address]  Phone: [phone]  Fax: [phone]    Renuka Munoz MD        September 27, 2021     [address]  [phone] [id number]      Dear Andi Land:    Below are the results from your recent visit:    Resulted Orders   Comprehensive Metabolic Panel   Result Value Ref Range    Sodium 144 132 - 146 mmol/L    Potassium 4.3 3.5 - 5.0 mmol/L    Chloride 106 98 - 107 mmol/L    CO2 23 22 - 29 mmol/L    Anion Gap 15 7 - 16 mmol/L    Glucose 97 74 - 99 mg/dL    BUN 12 6 - 20 mg/dL    CREATININE 0.8 0.5 - 1.0 mg/dL    GFR Non-African American >60 >=60 mL/min/1.73      Comment:      Chronic Kidney Disease: less than 60 ml/min/1.73 sq.m. Kidney Failure: less than 15 ml/min/1.73 sq.m. Results valid for patients 18 years and older.       GFR African American >60     Calcium 9.4 8.6 - 10.2 mg/dL    Total Protein 7.1 6.4 - 8.3 g/dL    Albumin 4.5 3.5 - 5.2 g/dL    Total Bilirubin 0.2 0.0 - 1.2 mg/dL    Alkaline Phosphatase 63 35 - 104 U/L    ALT 10 0 - 32 U/L    AST 13 0 - 31 U/L   CBC Auto Differential   Result Value Ref Range    WBC 5.2 4.5 - 11.5 E9/L    RBC 4.33 3.50 - 5.50 E12/L    Hemoglobin 12.8 11.5 - 15.5 g/dL    Hematocrit 39.2 34.0 - 48.0 %    MCV 90.5 80.0 - 99.9 fL    MCH 29.6 26.0 - 35.0 pg    MCHC 32.7 32.0 - 34.5 %    RDW 13.2 11.5 - 15.0 fL    Platelets 537 979 - 454 E9/L    MPV 11.8 7.0 - 12.0 fL    Neutrophils % 57.9 43.0 - 80.0 %    Immature Granulocytes % 0.2 0.0 - 5.0 %    Lymphocytes % 31.3 20.0 - 42.0 %    Monocytes % 5.2 2.0 - 12.0 %    Eosinophils % 4.6 0.0 - 6.0 %    Basophils % 0.8 0.0 - 2.0 %    Neutrophils Absolute 3.01 1.80 - 7.30 E9/L    Immature Granulocytes # 0.01 E9/L    Lymphocytes Absolute 1.63 1.50 - 4.00 E9/L    Monocytes Absolute 0.27 0.10 - 0.95 E9/L    Eosinophils Absolute 0.24 0.05 - 0.50 E9/L    Basophils Absolute 0.04 0.00 - 0.20 E9/L   Urinalysis   Result Value Ref Range    Color, UA Yellow Straw/Yellow    Clarity, UA SL CLOUDY Clear    Glucose, Ur Negative Negative mg/dL    Bilirubin Urine Negative Negative    Ketones, Urine Negative Negative mg/dL    Specific Gravity, UA >=1.030 1.005 - 1.030    Blood, Urine TRACE (A) Negative    pH, UA 5.5 5.0 - 9.0    Protein, UA Negative Negative mg/dL    Urobilinogen, Urine 0.2 <2.0 E.U./dL    Nitrite, Urine Negative Negative    Leukocyte Esterase, Urine MODERATE (A) Negative   TSH without Reflex   Result Value Ref Range    TSH 1.090 0.270 - 4.200 uIU/mL   Vitamin D 25 Hydroxy   Result Value Ref Range    Vit D, 25-Hydroxy 44 30 - 100 ng/mL      Comment:      <20 ng/mL. ........... Vinod Salter Deficient  20-30 ng/mL. ......... Vinod Salter Insufficient   ng/mL. ........ Vinod Salter Sufficient  >100 ng/mL. .......... Vinod Salter Toxic     Lipid, Fasting   Result Value Ref Range    Cholesterol, Fasting 191 0 - 199 mg/dL    Triglyceride, Fasting 99 0 - 149 mg/dL    HDL 50 >40 mg/dL    LDL Calculated 121 (H) 0 - 99 mg/dL    VLDL Cholesterol Calculated 20 mg/dL   Magnesium   Result Value Ref Range    Magnesium 2.2 1.6 - 2.6 mg/dL   Microscopic Urinalysis   Result Value Ref Range    WBC, UA 5-10 (A) 0 - 5 /HPF    RBC, UA 1-3 0 - 2 /HPF    Bacteria, UA FEW (A) None Seen /HPF     The test results show:    Cholesterol levels were elevated but improved when compared to previous. HDL or good cholesterol is borderline to beneficial. The 10-year ASCVD risk score (Neri Kerns, et al., 2013) is: 0.7%, which is lower risk for heart vascular complications in the next 10 years     Urine analysis showed trace microscopic blood white blood cells and rare bacteria     Vitamin D levels were normal     Thyroid levels were normal     Blood counts were normal     Electrolytes, liver, and blood counts were normal    If you have any questions or concerns, please don't hesitate to call.     Sincerely,        Mark Chahal MD

## 2021-10-13 ENCOUNTER — OFFICE VISIT (OUTPATIENT)
Dept: FAMILY MEDICINE CLINIC | Age: 51
End: 2021-10-13
Payer: COMMERCIAL

## 2021-10-13 VITALS
HEART RATE: 68 BPM | WEIGHT: 152 LBS | SYSTOLIC BLOOD PRESSURE: 110 MMHG | OXYGEN SATURATION: 98 % | TEMPERATURE: 97.5 F | BODY MASS INDEX: 27.8 KG/M2 | DIASTOLIC BLOOD PRESSURE: 70 MMHG

## 2021-10-13 DIAGNOSIS — G47.09 OTHER INSOMNIA: ICD-10-CM

## 2021-10-13 DIAGNOSIS — E78.2 MIXED HYPERLIPIDEMIA: Primary | ICD-10-CM

## 2021-10-13 DIAGNOSIS — N95.1 VASOMOTOR SYMPTOMS DUE TO MENOPAUSE: ICD-10-CM

## 2021-10-13 DIAGNOSIS — L29.3 GENITAL PRURITUS: ICD-10-CM

## 2021-10-13 DIAGNOSIS — E55.9 VITAMIN D DEFICIENCY: ICD-10-CM

## 2021-10-13 DIAGNOSIS — M85.89 OSTEOPENIA OF MULTIPLE SITES: ICD-10-CM

## 2021-10-13 DIAGNOSIS — G43.009 MIGRAINE WITHOUT AURA AND WITHOUT STATUS MIGRAINOSUS, NOT INTRACTABLE: ICD-10-CM

## 2021-10-13 DIAGNOSIS — K21.9 GASTROESOPHAGEAL REFLUX DISEASE WITHOUT ESOPHAGITIS: ICD-10-CM

## 2021-10-13 DIAGNOSIS — R82.90 ABNORMAL URINE: ICD-10-CM

## 2021-10-13 DIAGNOSIS — F41.1 GENERALIZED ANXIETY DISORDER: ICD-10-CM

## 2021-10-13 PROCEDURE — 99213 OFFICE O/P EST LOW 20 MIN: CPT | Performed by: INTERNAL MEDICINE

## 2021-10-13 RX ORDER — ACETAMINOPHEN, ASPIRIN AND CAFFEINE 250; 250; 65 MG/1; MG/1; MG/1
1 TABLET, FILM COATED ORAL PRN
COMMUNITY

## 2021-10-13 SDOH — ECONOMIC STABILITY: FOOD INSECURITY: WITHIN THE PAST 12 MONTHS, YOU WORRIED THAT YOUR FOOD WOULD RUN OUT BEFORE YOU GOT MONEY TO BUY MORE.: NEVER TRUE

## 2021-10-13 SDOH — ECONOMIC STABILITY: FOOD INSECURITY: WITHIN THE PAST 12 MONTHS, THE FOOD YOU BOUGHT JUST DIDN'T LAST AND YOU DIDN'T HAVE MONEY TO GET MORE.: NEVER TRUE

## 2021-10-13 ASSESSMENT — ENCOUNTER SYMPTOMS
CHEST TIGHTNESS: 0
NAUSEA: 0
DIARRHEA: 1
RHINORRHEA: 0
BLOOD IN STOOL: 0
EYE PAIN: 0
VOMITING: 0
SORE THROAT: 0
SHORTNESS OF BREATH: 0
ABDOMINAL PAIN: 0
CONSTIPATION: 0
COUGH: 0

## 2021-10-13 ASSESSMENT — SOCIAL DETERMINANTS OF HEALTH (SDOH): HOW HARD IS IT FOR YOU TO PAY FOR THE VERY BASICS LIKE FOOD, HOUSING, MEDICAL CARE, AND HEATING?: NOT HARD AT ALL

## 2021-10-13 NOTE — PROGRESS NOTES
[facility]) Physicians   Internal Medicine     10/13/2021  Arabella Jackson : 1970 Sex: female  Age:51 y.o. Chief Complaint   Patient presents with    6 Month Follow-Up     Scheduld for breast reconstruction (3rd surgery) 10/14/21    Nail Problem     \"Beaus lines\"  nails.  Hyperlipidemia        HPI:   Patient presents to office for evaluation of the following medical concerns. - States has been having mid back pain. States feels has if someone is poking with hot fingers. Symptoms fluctuating. States worse with sitting. States has seen chiropractor.      - States has been having generalized pruritus. Taking benadryl nightly. Stable. - States had bone scan for bone density. States improved bone density. States osteopenia. States stopped Prolia  (last 2019). Last bone density was 2019.     - States still with hot flashes and anxiety. Hot flashes are less often. Stable. - States has had breast cancer. No longer following with oncology. New oncologist is Dr. Keith Mondragon. Remission. States oncology stopped Arimidex, Stopped  letrozole was on for total of 5 years. - States chronic headaches. States has migraines. States weaned off Topamax. States almost daily regular headaches. Not interested in prophylactic medications    - States having acid reflux is improved. Stopped pantoprazole. Watching Diet.    - States having numbness or hands - States uncertain carpal tunnel.     - States right humeral fracture. Hardware removed. Previous surgery was ORIF with plates and screws. States arm feels better. Still with decreased strength. States decreased ROM. Stable. States numbness and tingling in hands.     Health Maintenance   - immunizations:   Influenza Vaccination - ()  Pneumonia Vaccination  Zoster/Shingles Vaccine  Tetanus Vaccination- ?  covid (3/31/2021) #1, (2021)     - Screenings:   Bone Density Scan- (2017) - osteopenia, (2018) osteopenia spine tscore (-2.4), (12/2019) - 1. Findings of osteopenia in the lumbar spine with no statistically significant change, tscore (-1.5) spine, (-0.3) right hip     Pelvic/Pap Exam - (10/2018) - gyn   Mammogram - (6/1/2017), mastectomy     Colonoscopy     US breast - (5/2017) - benign nodule    ROS:  Review of Systems   Constitutional: Negative for appetite change, chills, fever and unexpected weight change. HENT: Negative for congestion, rhinorrhea and sore throat. Eyes: Negative for pain and visual disturbance. Respiratory: Negative for cough, chest tightness and shortness of breath. Cardiovascular: Negative for chest pain and palpitations. Gastrointestinal: Positive for diarrhea. Negative for abdominal pain, blood in stool, constipation, nausea and vomiting. Genitourinary: Negative for difficulty urinating, dysuria, frequency, pelvic pain, urgency and vaginal bleeding. Musculoskeletal: Negative for arthralgias and myalgias. Skin: Negative for rash. Neurological: Positive for headaches. Negative for dizziness, syncope, weakness, light-headedness and numbness. Hematological: Negative for adenopathy. Psychiatric/Behavioral: Negative for suicidal ideas. The patient is not nervous/anxious.           Current Outpatient Medications:     aspirin-acetaminophen-caffeine (EXCEDRIN MIGRAINE) 250-250-65 MG per tablet, Take 1 tablet by mouth as needed for Headaches, Disp: , Rfl:     Calcium Carb-Cholecalciferol (CALCIUM 600+D3 PO), Take by mouth, Disp: , Rfl:     Melatonin 12 MG TABS, Take by mouth, Disp: , Rfl:     Cholecalciferol (VITAMIN D3 PO), Take 2,000 Units by mouth daily, Disp: , Rfl:     No Known Allergies    Past Medical History:   Diagnosis Date    Back pain     Cancer ([facility] [address] 7/2014    breast MCCLAIN    Gastroesophageal reflux disease without esophagitis 5/10/2019    Generalized anxiety disorder 5/10/2019    Migraine without aura and without status migrainosus, not intractable 5/10/2019    Mixed hyperlipidemia 5/10/2019    Osteopenia of multiple sites 5/10/2019    Other insomnia 5/10/2019    Other osteoporosis without current pathological fracture 5/10/2019    Peripheral autonomic neuropathy in disorders classified elsewhere 5/10/2019    Recurrent oral aphthae 5/10/2019       Past Surgical History:   Procedure Laterality Date    BREAST SURGERY Bilateral 2014    Mastectomy    BREAST SURGERY Bilateral 2014    Implant    EYE SURGERY Bilateral     lasik    FRACTURE SURGERY Right     humerus, open reduction fixation shoulder    FRACTURE SURGERY  2016    removal of hardware    HYSTERECTOMY      MASTECTOMY Bilateral 2014    INSERTION OF TISSUE EXPANDERS    SHOULDER SURGERY Right 2015    WISDOM TOOTH EXTRACTION         Family History   Problem Relation Age of Onset    Cancer Paternal Grandmother 80        breast     Cancer Paternal Grandfather 79        prostate    Breast Cancer Mother     Colon Cancer Mother     No Known Problems Father        Social History     Socioeconomic History    Marital status:      Spouse name: Not on file    Number of children: Not on file    Years of education: Not on file    Highest education level: Not on file   Occupational History    Not on file   Tobacco Use    Smoking status: Former Smoker     Packs/day: 0.50     Years: 8.00     Pack years: 4.00     Types: Cigarettes     Start date:      Quit date:      Years since quittin.7    Smokeless tobacco: Never Used   Vaping Use    Vaping Use: Never used   Substance and Sexual Activity    Alcohol use: No    Drug use: No    Sexual activity: Not on file   Other Topics Concern    Not on file   Social History Narrative    Not on file     Social Determinants of Health     Financial Resource Strain: Low Risk     Difficulty of Paying Living Expenses: Not hard at all   Food Insecurity: No Food Insecurity    Worried About [address] [facility] in the Last Year: Never true   Marry Glover Ran Out of Food in the Last Year: Never true   Transportation Needs:     Lack of Transportation (Medical):  Lack of Transportation (Non-Medical):    Physical Activity:     Days of Exercise per Week:     Minutes of Exercise per Session:    Stress:     Feeling of Stress :    Social Connections:     Frequency of Communication with Friends and Family:     Frequency of Social Gatherings with Friends and Family:     Attends [employer] Services:     Active Member of Clubs or Organizations:     Attends Club or Organization Meetings:     Marital Status:    Intimate Partner Violence:     Fear of Current or Ex-Partner:     Emotionally Abused:     Physically Abused:     Sexually Abused:        Vitals:    10/13/21 1518   BP: 110/70   Pulse: 68   Temp: 97.5 °F (36.4 °C)   SpO2: 98%   Weight: 152 lb (68.9 kg)       Exam:  Physical Exam  Constitutional:       Appearance: She is well-developed. HENT:      Head: Normocephalic and atraumatic. Right Ear: External ear normal.      Left Ear: External ear normal.      Nose: Congestion and rhinorrhea present. Mouth/Throat:      Comments: Hoarseness   Eyes:      Pupils: Pupils are equal, round, and reactive to light. Neck:      Thyroid: No thyromegaly. Cardiovascular:      Rate and Rhythm: Normal rate and regular rhythm. Heart sounds: Normal heart sounds. No murmur heard. Pulmonary:      Effort: Pulmonary effort is normal.      Breath sounds: Normal breath sounds. No wheezing. Abdominal:      General: Bowel sounds are normal. There is no distension. Palpations: Abdomen is soft. Tenderness: There is no abdominal tenderness. There is no guarding or rebound. Musculoskeletal:         General: Normal range of motion. Cervical back: Normal range of motion and neck supple. Lymphadenopathy:      Cervical: No cervical adenopathy. Skin:     General: Skin is warm and dry.    Neurological:      Mental Status: She is alert and oriented to person, place, and time. Cranial Nerves: No cranial nerve deficit.    Psychiatric:         Judgment: Judgment normal.         Assessment and Plan:    Diagnoses and all orders for this visit:    Malignant neoplasm of lower-outer quadrant of left female breast, unspecified estrogen receptor status ([facility] [address]  - s/p bilateral mastectomy   - no longer following with oncology  - off arimadex  - off letrozole (2019) - was treated for 5 years   - stable     Migraine without aura and without status migrainosus, not intractable  - on excedrin  - discussed prophylactic meds - declines   - stopped topamax - does not think is helping  - headache 4 per week - migraine headache 2/month  - declines medication     Generalized anxiety disorder  - off venlafaxine  - off prozac  - on Cymbalta 20mg - would like to wean   - Stable     Vasomotor symptoms due to menopause  - off prozac  - on calcium and vitamin D  - off gabapentin   - offcymbalta   - stable and improved since stopping Arimidex     Other insomnia  - observe  - off meds  - discussed benadryl  - on melatonin  - stable     Osteopenia of multiple sites  - off prolia every 6 months   - last DEXA 12/2019    Vitamin D deficiency  - on otc supplement   - follow labs     Peripheral autonomic neuropathy in disorders classified elsewhere  - off Cymbalta - would like to wean off medications  - off gabapentin  - stable     Mixed hyperlipidemia  - watch diet   - may need to consider meds, calculate 10year risk with next blood work     Gastroesophageal reflux disease without esophagitis  - watch diet   - off pantoprazole   - stable     Genital pruritus  - On benadryl as needed     Recurrent oral aphthae  - uncertain as to cause  - uncertain if side effects of meds  - Magic Mouthwash swish and swallow 1tsp q4hr as needed    Acute thoracic back pain, unspecified back pain laterality  - declines xray   - referral to chiropractor       The 10-year ASCVD risk score (Shlomo Hurst., et al., 2013)

## 2021-10-18 ENCOUNTER — TELEPHONE (OUTPATIENT)
Dept: FAMILY MEDICINE CLINIC | Age: 51
End: 2021-10-18

## 2021-10-18 NOTE — TELEPHONE ENCOUNTER
Urine was not run from 10/13/21. Sample will need to be recollected. Pt was informed. Mother passed away and she would like to address this in Dec. I will mail a lab order for her to do through the lab.

## 2021-10-21 ENCOUNTER — HOSPITAL ENCOUNTER (OUTPATIENT)
Age: 51
Discharge: HOME | End: 2021-10-23

## 2021-10-21 PROCEDURE — 88302 TISSUE EXAM BY PATHOLOGIST: CPT

## 2021-11-05 ENCOUNTER — TELEPHONE (OUTPATIENT)
Dept: FAMILY MEDICINE CLINIC | Age: 51
End: 2021-11-05

## 2021-11-05 NOTE — TELEPHONE ENCOUNTER
Just to be clear, the repeat urine analysis that the patient was referring to was done at the gynecologist office and I am waiting to see those results?

## 2021-11-05 NOTE — TELEPHONE ENCOUNTER
----- Message from Carmen So sent at 11/5/2021  8:59 AM EDT -----  Subject: Message to Provider    QUESTIONS  Information for Provider? Arabella was told she needed to retake a urinalysis   and she is letting Dr. Francy Ott know she did a new urinalysis at her OB/GYN   11/5 (today). OB/GYN is going to let Dr. Francy Ott know the results. ---------------------------------------------------------------------------  --------------  Raegan MENDOZA  What is the best way for the office to contact you? OK to leave message on   voicemail  Preferred Call Back Phone Number? [phone]  ---------------------------------------------------------------------------  --------------  SCRIPT ANSWERS  Relationship to Patient?  Self

## 2021-11-05 NOTE — TELEPHONE ENCOUNTER
Spoke with patient to clarify and she stated yes, according to her doctors office the should be sending you the results to the urine analysis. Patient stated that once she gets the results back as well she will call and clarify with them that they sent it to our office.

## 2021-11-09 ENCOUNTER — TELEPHONE (OUTPATIENT)
Dept: FAMILY MEDICINE CLINIC | Age: 51
End: 2021-11-09

## 2021-11-09 NOTE — TELEPHONE ENCOUNTER
I did receive urine results from gynecology    Urine analysis was normal per the report that I received    Thanks

## 2022-10-12 ENCOUNTER — OFFICE VISIT (OUTPATIENT)
Dept: FAMILY MEDICINE CLINIC | Age: 52
End: 2022-10-12
Payer: COMMERCIAL

## 2022-10-12 VITALS
SYSTOLIC BLOOD PRESSURE: 96 MMHG | DIASTOLIC BLOOD PRESSURE: 74 MMHG | HEART RATE: 73 BPM | OXYGEN SATURATION: 98 % | TEMPERATURE: 98.4 F | BODY MASS INDEX: 28.34 KG/M2 | RESPIRATION RATE: 18 BRPM | WEIGHT: 154 LBS | HEIGHT: 62 IN

## 2022-10-12 DIAGNOSIS — Z23 NEED FOR INFLUENZA VACCINATION: Primary | ICD-10-CM

## 2022-10-12 DIAGNOSIS — Z12.11 SCREENING FOR MALIGNANT NEOPLASM OF COLON: ICD-10-CM

## 2022-10-12 DIAGNOSIS — Z23 NEED FOR PROPHYLACTIC VACCINATION WITH TETANUS-DIPHTHERIA (TD): ICD-10-CM

## 2022-10-12 DIAGNOSIS — Z00.00 ENCOUNTER FOR WELL ADULT EXAM WITHOUT ABNORMAL FINDINGS: ICD-10-CM

## 2022-10-12 DIAGNOSIS — Z23 NEED FOR PROPHYLACTIC VACCINATION AND INOCULATION AGAINST VARICELLA: ICD-10-CM

## 2022-10-12 PROCEDURE — G8482 FLU IMMUNIZE ORDER/ADMIN: HCPCS | Performed by: INTERNAL MEDICINE

## 2022-10-12 PROCEDURE — 99396 PREV VISIT EST AGE 40-64: CPT | Performed by: INTERNAL MEDICINE

## 2022-10-12 PROCEDURE — 90674 CCIIV4 VAC NO PRSV 0.5 ML IM: CPT | Performed by: INTERNAL MEDICINE

## 2022-10-12 PROCEDURE — 90471 IMMUNIZATION ADMIN: CPT | Performed by: INTERNAL MEDICINE

## 2022-10-12 RX ORDER — ZOSTER VACCINE RECOMBINANT, ADJUVANTED 50 MCG/0.5
0.5 KIT INTRAMUSCULAR ONCE
Qty: 0.5 ML | Refills: 0 | Status: SHIPPED | OUTPATIENT
Start: 2022-10-12 | End: 2022-10-12

## 2022-10-12 ASSESSMENT — PATIENT HEALTH QUESTIONNAIRE - PHQ9
SUM OF ALL RESPONSES TO PHQ QUESTIONS 1-9: 0
SUM OF ALL RESPONSES TO PHQ9 QUESTIONS 1 & 2: 0
SUM OF ALL RESPONSES TO PHQ QUESTIONS 1-9: 0
1. LITTLE INTEREST OR PLEASURE IN DOING THINGS: 0
2. FEELING DOWN, DEPRESSED OR HOPELESS: 0

## 2022-10-12 ASSESSMENT — ENCOUNTER SYMPTOMS
BLOOD IN STOOL: 0
COUGH: 0
DIARRHEA: 0
EYE PAIN: 0
RHINORRHEA: 0
ABDOMINAL PAIN: 0
CHEST TIGHTNESS: 0
SHORTNESS OF BREATH: 0
CONSTIPATION: 0
NAUSEA: 0
VOMITING: 0
SORE THROAT: 0

## 2022-10-12 NOTE — PROGRESS NOTES
Well Adult Note  Name: Francisco Cunningham Date: 10/12/2022   MRN: [id number] Sex: Female   Age: 47 y.o. Ethnicity: Non- / Non    : 1970 Race: White (non-)      Dae Wells is here for well adult exam.  History:     - States has been having generalized pruritus. Taking benadryl nightly. Non drowsy antihistamine in am. Stable. - Has anxiety. Declines treatment.      - States had bone scan for bone density. States improved bone density. States osteopenia. States stopped Prolia  (last 2019). Last bone density was 2019.      - States still with hot flashes. Hot flashes are less often. Stable. - States has had breast cancer. No longer following with oncology. New oncologist is Dr. Regi Aceves. Remission. States oncology stopped Arimidex, Stopped  letrozole was on for total of 5 years. - States chronic headaches. States has migraines. States weaned off Topamax. States almost daily regular headaches. Not interested in prophylactic medications     - States having acid reflux is improved. Stopped pantoprazole. Watching Diet. States occasionally takes tums. - States having numbness or hands - States uncertain carpal tunnel.     - States has been having mid back pain. States feels has if someone is poking with hot fingers. Symptoms fluctuating. States worse with sitting. States has seen chiropractor.       - States right humeral fracture. Hardware removed. Previous surgery was ORIF with plates and screws. States arm feels better. Still with decreased strength. States decreased ROM. Stable. States numbness and tingling in hands.     The 10-year ASCVD risk score (Mary TANNER, et al., 2019) is: 0.8%    Values used to calculate the score:      Age: 47 years      Sex: Female      Is Non- : No      Diabetic: No      Tobacco smoker: No      Systolic Blood Pressure: 96 mmHg      Is BP treated: No      HDL Cholesterol: 50 mg/dL      Total Cholesterol: 191 mg/dL     Health Maintenance   - immunizations:   Influenza Vaccination - (2020), (2022)   Pneumonia Vaccination  Zoster/Shingles Vaccine  Tetanus Vaccination- ?  covid (3/31/2021) #1, (4/28/2021) #2, (12/10/2021) #3 - moderna      - Screenings:   Bone Density Scan- (12/7/2017) - osteopenia, (2018) osteopenia spine tscore (-2.4), (12/2019) - 1. Findings of osteopenia in the lumbar spine with no statistically significant change, tscore (-1.5) spine, (-0.3) right hip      Pelvic/Pap Exam - (10/2018) - gyn   Mammogram - (6/1/2017), mastectomy      Colonoscopy      US breast - (5/2017) - benign nodule    Review of Systems   Constitutional:  Negative for appetite change, chills, fever and unexpected weight change. HENT:  Negative for congestion, rhinorrhea and sore throat. Eyes:  Negative for pain and visual disturbance. Respiratory:  Negative for cough, chest tightness and shortness of breath. Cardiovascular:  Negative for chest pain and palpitations. Gastrointestinal:  Negative for abdominal pain, blood in stool, constipation, diarrhea, nausea and vomiting. Genitourinary:  Negative for difficulty urinating, dysuria, frequency, pelvic pain, urgency and vaginal bleeding. Musculoskeletal:  Negative for arthralgias and myalgias. Skin:  Negative for rash. Neurological:  Positive for headaches. Negative for dizziness, syncope, weakness, light-headedness and numbness. Hematological:  Negative for adenopathy. Psychiatric/Behavioral:  Negative for suicidal ideas. The patient is not nervous/anxious. No Known Allergies      Prior to Visit Medications    Medication Sig Taking?  Authorizing Provider   Tdap (ADACEL) 5-2-15.5 LF-MCG/0.5 injection Inject 0.5 mLs into the muscle once for 1 dose Yes Feliberto Blount MD   zoster recombinant adjuvanted vaccine [facility]) 50 MCG/0.5ML SUSR injection Inject 0.5 mLs into the muscle once for 1 dose Yes Feliberto Blount MD   aspirin-acetaminophen-caffeine [facility] MIGRAINE) 250-250-65 MG per tablet Take 1 tablet by mouth as needed for Headaches Yes Historical Provider, MD   Calcium Carb-Cholecalciferol (CALCIUM 600+D3 PO) Take by mouth Yes Historical Provider, MD   Melatonin 12 MG TABS Take by mouth Yes Historical Provider, MD   Cholecalciferol (VITAMIN D3 PO) Take 2,000 Units by mouth daily Yes Historical Provider, MD         Past Medical History:   Diagnosis Date    Back pain     Cancer ([facility] [address] 2014    breast MCCLAIN    Gastroesophageal reflux disease without esophagitis 5/10/2019    Generalized anxiety disorder 5/10/2019    Migraine without aura and without status migrainosus, not intractable 5/10/2019    Mixed hyperlipidemia 5/10/2019    Osteopenia of multiple sites 5/10/2019    Other insomnia 5/10/2019    Other osteoporosis without current pathological fracture 5/10/2019    Peripheral autonomic neuropathy in disorders classified elsewhere 5/10/2019    Recurrent oral aphthae 5/10/2019       Past Surgical History:   Procedure Laterality Date    BREAST SURGERY Bilateral 2014    Mastectomy    BREAST SURGERY Bilateral 2014    Implant    EYE SURGERY Bilateral     lasik    FRACTURE SURGERY Right     humerus, open reduction fixation shoulder    FRACTURE SURGERY  2016    removal of hardware    HYSTERECTOMY (CERVIX STATUS UNKNOWN)      MASTECTOMY Bilateral 2014    INSERTION OF TISSUE EXPANDERS    SHOULDER SURGERY Right 2015    WISDOM TOOTH EXTRACTION           Family History   Problem Relation Age of Onset    Cancer Paternal Grandmother 80        breast     Cancer Paternal Grandfather 79        prostate    Breast Cancer Mother     Colon Cancer Mother     No Known Problems Father        Social History     Tobacco Use    Smoking status: Former     Packs/day: 0.50     Years: 8.00     Pack years: 4.00     Types: Cigarettes     Start date:      Quit date:      Years since quittin.7    Smokeless tobacco: Never   Vaping Use    Vaping Use: Never used Substance Use Topics    Alcohol use: No    Drug use: No       Objective   BP 96/74 (Site: Left Upper Arm, Position: Sitting, Cuff Size: Medium Adult)   Pulse 73   Temp 98.4 °F (36.9 °C) (Temporal)   Resp 18   Ht 5' 2\" (1.575 m)   Wt 154 lb (69.9 kg)   SpO2 98%   BMI 28.17 kg/m²   Wt Readings from Last 3 Encounters:   10/12/22 154 lb (69.9 kg)   10/13/21 152 lb (68.9 kg)   04/21/21 147 lb 4 oz (66.8 kg)     There were no vitals filed for this visit. Physical Exam  Vitals reviewed. Constitutional:       Appearance: She is well-developed. HENT:      Head: Normocephalic and atraumatic. Right Ear: External ear normal.      Left Ear: External ear normal.   Eyes:      Pupils: Pupils are equal, round, and reactive to light. Neck:      Thyroid: No thyromegaly. Cardiovascular:      Rate and Rhythm: Normal rate and regular rhythm. Heart sounds: Normal heart sounds. No murmur heard. Pulmonary:      Effort: Pulmonary effort is normal.      Breath sounds: Normal breath sounds. No wheezing. Abdominal:      General: Bowel sounds are normal. There is no distension. Palpations: Abdomen is soft. Tenderness: There is no abdominal tenderness. There is no guarding or rebound. Musculoskeletal:         General: Normal range of motion. Cervical back: Normal range of motion and neck supple. Lymphadenopathy:      Cervical: No cervical adenopathy. Skin:     General: Skin is warm and dry. Neurological:      Mental Status: She is alert and oriented to person, place, and time. Cranial Nerves: No cranial nerve deficit.    Psychiatric:         Judgment: Judgment normal.         Assessment   Plan     Encounter for well adult exam without abnormal findings    Health Maintenance   - immunizations:   Influenza Vaccination - (2020), (2022)   Pneumonia Vaccination  Zoster/Shingles Vaccine  Tetanus Vaccination- ?  covid (3/31/2021) #1, (4/28/2021) #2, (12/10/2021) #3 - moderna      - Screenings:   Bone Density Scan- (12/7/2017) - osteopenia, (2018) osteopenia spine tscore (-2.4), (12/2019) - 1. Findings of osteopenia in the lumbar spine with no statistically significant change, tscore (-1.5) spine, (-0.3) right hip      Pelvic/Pap Exam - (10/2018) - gyn   Mammogram - (6/1/2017), mastectomy      Colonoscopy     Need for prophylactic vaccination with tetanus-diphtheria (Td)  -     Tdap (ADACEL) 5-2-15.5 LF-MCG/0.5 injection; Inject 0.5 mLs into the muscle once for 1 dose, Disp-0.5 mL, R-0Print  Need for prophylactic vaccination and inoculation against varicella  -     zoster recombinant adjuvanted vaccine [facility]) 50 MCG/0.5ML SUSR injection;  Inject 0.5 mLs into the muscle once for 1 dose, Disp-0.5 mL, R-0Print    Need for influenza vaccination  -     Influenza, FLUCELVAX, (age 10 mo+), IM, Preservative Free, 0.5 mL    Malignant neoplasm of lower-outer quadrant of left female breast, unspecified estrogen receptor status ([facility] [address]  - s/p bilateral mastectomy   - no longer following with oncology  - off arimadex  - off letrozole (2019) - was treated for 5 years   - stable      Migraine without aura and without status migrainosus, not intractable  - on excedrin  - discussed prophylactic meds - declines   - stopped topamax - does not think is helping  - headache 4 per week - migraine headache 2/month  - declines medication      Generalized anxiety disorder  - off venlafaxine  - off prozac  - on Cymbalta 20mg - would like to wean   - Stable      Vasomotor symptoms due to menopause  - off prozac  - on calcium and vitamin D  - off gabapentin   - off cymbalta   - stable and improved since stopping Arimidex      Other insomnia  - observe  - off meds  - discussed benadryl  - on melatonin  - stable      Osteopenia of multiple sites  - off prolia every 6 months   - last DEXA 12/2019     Vitamin D deficiency  - on otc supplement   - follow labs      Peripheral autonomic neuropathy in disorders classified elsewhere  - off Cymbalta - would like to wean off medications  - off gabapentin  - stable      Mixed hyperlipidemia  - watch diet   - may need to consider meds, calculate 10year risk with next blood work      Gastroesophageal reflux disease without esophagitis  - watch diet   - off pantoprazole   - stable      Genital pruritus  - On benadryl as needed      Recurrent oral aphthae  - uncertain as to cause  - uncertain if side effects of meds  - Magic Mouthwash swish and swallow 1tsp q4hr as needed      Personalized Preventive Plan   Current Health Maintenance Status  Immunization History   Administered Date(s) Administered    COVID-19, MODERNA BLUE border, Primary or Immunocompromised, (age 12y+), IM, 100 mcg/0.5mL 03/31/2021, 04/28/2021, 12/10/2021    Influenza, FLUARIX, FLULAVAL, FLUZONE (age 10 mo+) AND AFLURIA, (age 1 y+), PF, 0.5mL 10/28/2020    Influenza, FLUCELVAX, (age 10 mo+), MDCK, PF, 0.5mL 10/12/2022        Health Maintenance   Topic Date Due    HIV screen  Never done    Hepatitis C screen  Never done    DTaP/Tdap/Td vaccine (1 - Tdap) Never done    Colorectal Cancer Screen  Never done    Shingles vaccine (1 of 2) Never done    COVID-19 Vaccine (4 - Booster for Moderna series) 04/10/2022    Depression Screen  04/21/2022    Lipids  09/24/2026    Flu vaccine  Completed    Hepatitis A vaccine  Aged Out    Hib vaccine  Aged Out    Meningococcal (ACWY) vaccine  Aged Out    Pneumococcal 0-64 years Vaccine  Aged Out     Recommendations for [email or web address] Due: see orders and patient instructions/AVS.    Return in 1 year (on 10/12/2023).     Electronically signed by Feliberto Blount MD on 10/12/2022 at 3:55 PM

## 2022-10-12 NOTE — PATIENT INSTRUCTIONS
Well Visit, Women 48 to 72: Care Instructions  Overview     Well visits can help you stay healthy. Your doctor has checked your overall health and may have suggested ways to take good care of yourself. Your doctor also may have recommended tests. At home, you can help prevent illness with healthy eating, regular exercise, and other steps. Follow-up care is a key part of your treatment and safety. Be sure to make and go to all appointments, and call your doctor if you are having problems. It's also a good idea to know your test results and keep a list of the medicines you take. How can you care for yourself at home? Get screening tests that you and your doctor decide on. Screening helps find diseases before any symptoms appear. Eat healthy foods. Choose fruits, vegetables, whole grains, protein, and low-fat dairy foods. Limit fat, especially saturated fat. Reduce salt in your diet. Limit alcohol. Have no more than 1 drink a day or 7 drinks a week. Get at least 30 minutes of exercise on most days of the week. Walking is a good choice. You also may want to do other activities, such as running, swimming, cycling, or playing tennis or team sports. Reach and stay at a healthy weight. This will lower your risk for many problems, such as obesity, diabetes, heart disease, and high blood pressure. Do not smoke. Smoking can make health problems worse. If you need help quitting, talk to your doctor about stop-smoking programs and medicines. These can increase your chances of quitting for good. Care for your mental health. It is easy to get weighed down by worry and stress. Learn strategies to manage stress, like deep breathing and mindfulness, and stay connected with your family and community. If you find you often feel sad or hopeless, talk with your doctor. Treatment can help. Talk to your doctor about whether you have any risk factors for sexually transmitted infections (STIs).  You can help prevent STIs if you wait to have sex with a new partner (or partners) until you've each been tested for STIs. It also helps if you use condoms (male or female condoms) and if you limit your sex partners to one person who only has sex with you. Vaccines are available for some STIs. If you think you may have a problem with alcohol or drug use, talk to your doctor. This includes prescription medicines (such as amphetamines and opioids) and illegal drugs (such as cocaine and methamphetamine). Your doctor can help you figure out what type of treatment is best for you. Protect your skin from too much sun. When you're outdoors from 10 a.m. to 4 p.m., stay in the shade or cover up with clothing and a hat with a wide brim. Wear sunglasses that block UV rays. Even when it's cloudy, put broad-spectrum sunscreen (SPF 30 or higher) on any exposed skin. See a dentist one or two times a year for checkups and to have your teeth cleaned. Wear a seat belt in the car. When should you call for help? Watch closely for changes in your health, and be sure to contact your doctor if you have any problems or symptoms that concern you. Where can you learn more? Go to https://WISE s.r.lpeSOMA Analyticseweb.health-partners. org and sign in to your [employer] account. Enter Y556 in the [facility] box to learn more about \"Well Visit, Women 50 to 72: Care Instructions. \"     If you do not have an account, please click on the \"Sign Up Now\" link. Current as of: June 6, 2022               Content Version: 13.4  © 2006-2022 Healthwise, Incorporated. Care instructions adapted under license by [facility] ([facility]). If you have questions about a medical condition or this instruction, always ask your healthcare professional. [place] [address] any warranty or liability for your use of this information. A Healthy Lifestyle: Care Instructions  A healthy lifestyle can help you feel good, have more energy, and stay at a weight that's healthy for you.  You can share a healthy lifestyle with your friends and family. And you can do it on your own. Eat meals with your friends or family. You could try cooking together. Plan activities with other people. Go for a walk with a friend, try a free online fitness class, or join a sports league. Eat a variety of healthy foods. These include fruits, vegetables, whole grains, low-fat dairy, and lean protein. Choose healthy portions of food. You can use the Nutrition Facts label on food packages as a guide. Eat more fruits and vegetables. You could add vegetables to sandwiches or add fruit to cereal.  Drink water when you are thirsty. Limit soda, juice, and sports drinks. Try to exercise most days. Aim for at least 2½ hours of exercise each week. Keep moving. Work in the garden or take your dog on a walk. Use the stairs instead of the elevator. If you use tobacco or nicotine, try to quit. Ask your doctor about programs and medicines to help you quit. Limit alcohol. Men should have no more than 2 drinks a day. Women should have no more than 1. For some people, no alcohol is the best choice. Follow-up care is a key part of your treatment and safety. Be sure to make and go to all appointments, and call your doctor if you are having problems. It's also a good idea to know your test results and keep a list of the medicines you take. Where can you learn more? Go to https://chleodan.[employer]. org and sign in to your [employer] account. Enter Y973 in the [facility] box to learn more about \"A Healthy Lifestyle: Care Instructions. \"     If you do not have an account, please click on the \"Sign Up Now\" link. Current as of: March 9, 2022               Content Version: 13.4  © 0490-1174 Healthwise, 1EQ. Care instructions adapted under license by [facility] ([facility]).  If you have questions about a medical condition or this instruction, always ask your healthcare professional. Deric Shah any warranty or liability for your use of this information. Heart-Healthy Diet   Sodium, Fat, and Cholesterol Controlled Diet       What Is a Heart Healthy Diet? A heart-healthy diet is one that limits sodium , certain types of fat , and cholesterol . This type of diet is recommended for:   People with any form of cardiovascular disease (eg, coronary heart disease , peripheral vascular disease , previous heart attack , previous stroke )   People with risk factors for cardiovascular disease, such as high blood pressure , high cholesterol , or diabetes   Anyone who wants to lower their risk of developing cardiovascular disease   Sodium    Sodium is a mineral found in many foods. In general, most people consume much more sodium than they need. Diets high in sodium can increase blood pressure and lead to edema (water retention). On a heart-healthy diet, you should consume no more than 2,300 mg (milligrams) of sodium per dayabout the amount in one teaspoon of table salt. The foods highest in sodium include table salt (about 50% sodium), processed foods, convenience foods, and preserved foods. Cholesterol    Cholesterol is a fat-like, waxy substance in your blood. Our bodies make some cholesterol. It is also found in animal products, with the highest amounts in fatty meat, egg yolks, whole milk, cheese, shellfish, and organ meats. On a heart-healthy diet, you should limit your cholesterol intake to less than 200 mg per day. It is normal and important to have some cholesterol in your bloodstream. But too much cholesterol can cause plaque to build up within your arteries, which can eventually lead to a heart attack or stroke. The two types of cholesterol that are most commonly referred to are:   Low-density lipoprotein (LDL) cholesterol  Also known as bad cholesterol, this is the cholesterol that tends to build up along your arteries.  Bad cholesterol levels are increased by eating fats that are saturated or hydrogenated. Optimal level of this cholesterol is less than 100. Over 130 starts to get risky for heart disease. High-density lipoprotein (HDL) cholesterol  Also known as good cholesterol, this type of cholesterol actually carries cholesterol away from your arteries and may, therefore, help lower your risk of having a heart attack. You want this level to be high (ideally greater than 60). It is a risk to have a level less than 40. You can raise this good cholesterol by eating olive oil, canola oil, avocados, or nuts. Exercise raises this level, too. Fat    Fat is calorie dense and packs a lot of calories into a small amount of food. Even though fats should be limited due to their high calorie content, not all fats are bad. In fact, some fats are quite healthful. Fat can be broken down into four main types.    The good-for-you fats are:   Monounsaturated fat  found in oils such as olive and canola, avocados, and nuts and natural nut butters; can decrease cholesterol levels, while keeping levels of HDL cholesterol high   Polyunsaturated fat  found in oils such as safflower, sunflower, soybean, corn, and sesame; can decrease total cholesterol and LDL cholesterol   Omega-3 fatty acids  particularly those found in fatty fish (such as salmon, trout, tuna, mackerel, herring, and sardines); can decrease risk of arrhythmias, decrease triglyceride levels, and slightly lower blood pressure   The fats that you want to limit are:   Saturated fat  found in animal products, many fast foods, and a few vegetables; increases total blood cholesterol, including LDL levels   Animal fats that are saturated include: butter, lard, whole-milk dairy products, meat fat, and poultry skin   Vegetable fats that are saturated include: hydrogenated shortening, palm oil, coconut oil, cocoa butter   Hydrogenated or trans fat  found in margarine and vegetable shortening, most shelf stable snack foods, and fried foods; increases LDL and decreases HDL     It is generally recommended that you limit your total fat for the day to less than 30% of your total calories. If you follow an 1800-calorie heart healthy diet, for example, this would mean 60 grams of fat or less per day. Saturated fat and trans fat in your diet raises your blood cholesterol the most, much more than dietary cholesterol does. For this reason, on a heart-healthy diet, less than 7% of your calories should come from saturated fat and ideally 0% from trans fat. On an 1800-calorie diet, this translates into less than 14 grams of saturated fat per day, leaving 46 grams of fat to come from mono- and polyunsaturated fats.    Food Choices on a Heart Healthy Diet   Food Category   Foods Recommended   Foods to Avoid   Grains   Breads and rolls without salted tops Most dry and cooked cereals Unsalted crackers and breadsticks Low-sodium or homemade breadcrumbs or stuffing All rice and pastas   Breads, rolls, and crackers with salted tops High-fat baked goods (eg, muffins, donuts, pastries) Quick breads, self-rising flour, and biscuit mixes Regular bread crumbs Instant hot cereals Commercially prepared rice, pasta, or stuffing mixes   Vegetables   Most fresh, frozen, and low-sodium canned vegetables Low-sodium and salt-free vegetable juices Canned vegetables if unsalted or rinsed   Regular canned vegetables and juices, including sauerkraut and pickled vegetables Frozen vegetables with sauces Commercially prepared potato and vegetable mixes   Fruits   Most fresh, frozen, and canned fruits All fruit juices   Fruits processed with salt or sodium   Milk   Nonfat or low-fat (1%) milk Nonfat or low-fat yogurt Cottage cheese, low-fat ricotta, cheeses labeled as low-fat and low-sodium   Whole milk Reduced-fat (2%) milk Malted and chocolate milk Full fat yogurt Most cheeses (unless low-fat and low salt) Buttermilk (no more than 1 cup per week)   Meats and Beans   Lean cuts of fresh or frozen beef, veal, lamb, or pork (look for the word loin) Fresh or frozen poultry without the skin Fresh or frozen fish and some shellfish Egg whites and egg substitutes (Limit whole eggs to three per week) Tofu Nuts or seeds (unsalted, dry-roasted), low-sodium peanut butter Dried peas, beans, and lentils   Any smoked, cured, salted, or canned meat, fish, or poultry (including puga, chipped beef, cold cuts, hot dogs, sausages, sardines, and anchovies) Poultry skins Breaded and/or fried fish or meats Canned peas, beans, and lentils Salted nuts   Fats and Oils   Olive oil and canola oil Low-sodium, low-fat salad dressings and mayonnaise   Butter, margarine, coconut and palm oils, puga fat   Snacks, Sweets, and Condiments   Low-sodium or unsalted versions of broths, soups, soy sauce, and condiments Pepper, herbs, and spices; vinegar, lemon, or lime juice Low-fat frozen desserts (yogurt, sherbet, fruit bars) Sugar, cocoa powder, honey, syrup, jam, and preserves Low-fat, trans-fat free cookies, cakes, and pies Jerry and animal crackers, fig bars, brodie snaps   High-fat desserts Broth, soups, gravies, and sauces, made from instant mixes or other high-sodium ingredients Salted snack foods Canned olives Meat tenderizers, seasoning salt, and most flavored vinegars   Beverages   Low-sodium carbonated beverages Tea and coffee in moderation Soy milk   Commercially softened water   Suggestions   Make whole grains, fruits, and vegetables the base of your diet. Choose heart-healthy fats such as canola, olive, and flaxseed oil, and foods high in heart-healthy fats, such as nuts, seeds, soybeans, tofu, and fish. Eat fish at least twice per week; the fish highest in omega-3 fatty acids and lowest in mercury include salmon, herring, mackerel, sardines, and canned chunk light tuna. If you eat fish less than twice per week or have high triglycerides, talk to your doctor about taking fish oil supplements. Read food labels.    For products low in fat and cholesterol, look for fat free, low-fat, cholesterol free, saturated fat free, and trans fat freeAlso scan the Nutrition Facts Label, which lists saturated fat, trans fat, and cholesterol amounts. For products low in sodium, look for sodium free, very low sodium, low sodium, no added salt, and unsalted   Skip the salt when cooking or at the table; if food needs more flavor, get creative and try out different herbs and spices. Garlic and onion also add substantial flavor to foods. Trim any visible fat off meat and poultry before cooking, and drain the fat off after fox. Use cooking methods that require little or no added fat, such as grilling, boiling, baking, poaching, broiling, roasting, steaming, stir-frying, and sauting. Avoid fast food and convenience food. They tend to be high in saturated and trans fat and have a lot of added salt. Talk to a registered dietitian for individualized diet advice. Last Reviewed: March 2011 Jose Muñoz MS, MPH, RD   Updated: 3/29/2011     Keeping Home a [facility]       As we get older, changes in balance, gait, strength, vision, hearing, and cognition make even the most youthful senior more prone to accidents. Falls are one of the leading health risks for older people. This increased risk of falling is related to:   Aging process (eg, decreased muscle strength, slowed reflexes)   Higher incidence of chronic health problems (eg, arthritis, diabetes) that may limit mobility, agility or sensory awareness   Side effects of medicine (eg, dizziness, blurred vision)especially medicines like prescription pain medicines and drugs used to treat mental health conditions   Depending on the brittleness of your bones, the consequences of a fall can be serious and long lasting.    Home Life   Research by the Association of Aging [facility]) shows that some home accidents among older adults can be prevented by making simple lifestyle changes and basic modifications and repairs to the home environment. Here are some lifestyle changes that experts recommend:   Have your hearing and vision checked regularly. Be sure to wear prescription glasses that are right for you. Speak to your doctor or pharmacist about the possible side effects of your medicines. A number of medicines can cause dizziness. If you have problems with sleep, talk to your doctor. Limit your intake of alcohol. If necessary, use a cane or walker to help maintain your balance. Wear supportive, rubber-soled shoes, even at home. If you live in a region that gets wintry weather, you may want to put special cleats on your shoes to prevent you from slipping on the snow and ice. Exercise regularly to help maintain muscle tone, agility, and balance. Always hold the banister when going up or down stairs. Also, use  bars when getting in or out of the bath or shower, or using the toilet. To avoid dizziness, get up slowly from a lying down position. Sit up first, dangling your legs for a minute or two before rising to a standing position. Overall Home Safety Check   According to the Consumer Product Safety Commision's \"Older Consumer Home Safety Checklist,\" it is important to check for potential hazards in each room. And remember, proper lighting is an essential factor in home safety. If you cannot see clearly, you are more likely to fall. Important questions to ask yourself include:   Are lamp, electric, extension, and telephone cords placed out of the flow of traffic and maintained in good condition? Have frayed cords been replaced? Are all small rugs and runners slip resistant? If not, you can secure them to the floor with a special double-sided carpet tape. Are smoke detectors properly locatedone on every floor of your home and one outside of every sleeping area? Are they in good working order? Are batteries replaced at least once a year?    Do you have a well-maintained carbon monoxide detector outside every sleeping are in your home? Does your furniture layout leave plenty of space to maneuver between and around chairs, tables, beds, and sofas? Are hallways, stairs and passages between rooms well lit? Can you reach a lamp without getting out of bed? Are floor surfaces well maintained? Shag rugs, high-pile carpeting, tile floors, and polished wood floors can be particularly slippery. Stairs should always have handrails and be carpeted or fitted with a non-skid tread. Is your telephone easily reachable. Is the cord safely tucked away? Room by Room   According to the Association of Aging, bathrooms and andria are the two most potentially hazardous rooms in your home. In the Kitchen    Be sure your stove is in proper working order and always make sure burners and the oven are off before you go out or go to sleep. Keep pots on the back burners, turn handles away from the front of the stove, and keep stove clean and free of grease build-up. Kitchen ventilation systems and range exhausts should be working properly. Keep flammable objects such as towels and pot holders away from the cooking area except when in use. Make sure kitchen curtains are tied back. Move cords and appliances away from the sink and hot surfaces. If extension cords are needed, install wiring guides so they do not hang over the sink, range, or working areas. Look for coffee pots, kettles and toaster ovens with automatic shut-offs. Keep a mop handy in the kitchen so you can wipe up spills instantly. You should also have a small fire extinguisher. Arrange your kitchen with frequently used items on lower shelves to avoid the need to stand on a stepstool to reach them. Make sure countertops are well-lit to avoid injuries while cutting and preparing food. In the Bathroom    Use a non-slip mat or decals in the tub and shower, since wet, soapy tile or porcelain surfaces are extremely slippery.     Make sure bathroom rugs are non-skid or tape them firmly to the floor. Bathtubs should have at least one, preferably two, grab bars, firmly attached to structural supports in the wall. (Do not use built-in soap holders or glass shower doors as grab bars.)    Tub seats fitted with non-slip material on the legs allow you to wash sitting down. For people with limited mobility, bathtub transfer benches allow you to slide safely into the tub. Raised toilet seats and toilet safety rails are helpful for those with knee or hip problems. In the [facility]    Make sure you use a nightlight and that the area around your bed is clear of potential obstacles. Be careful with electric blankets and never go to sleep with a heating pad, which can cause serious burns even if on a low setting. Use fire-resistant mattress covers and pillows, and NEVER smoke in bed. Keep a phone next to the bed that is programmed to dial 911 at the push of a button. If you have a chronic condition, you may want to sign on with an automatic call-in service. Typically the system includes a small pendant that connects directly to an emergency medical voice-response system. You should also make arrangements to stay in contact with someonefriend, neighbor, family memberon a regular schedule. Fire Prevention   According to the [employer]. (Smoke Alarms for Every) [address] [place] [address], senior citizens are one of the two highest risk groups for death and serious injuries due to residential fires. When cooking, wear short-sleeved items, never a bulky long-sleeved robe. The [facility]'s Safety Checklist for Older Consumers emphasizes the importance of checking basements, garages, workshops and storage areas for fire hazards, such as volatile liquids, piles of old rags or clothing and overloaded circuits. Never smoke in bed or when lying down on a couch or recliner chair.     Small portable electric or kerosene heaters are responsible for many home fires and should be used cautiously if at all. If you do use one, be sure to keep them away from flammable materials. In case of fire, make sure you have a pre-established emergency exit plan. Have a professional check your fireplace and other fuel-burning appliances yearly. [employer]   Baby boomers entering the rowland years will continue to see the development of new products to help older adults live safely and independently in spite of age-related changes. Making Life More Livable  , by Niels Santillan, lists over 1,000 products for \"living well in the mature years,\" such as bathing and mobility aids, household security devices, ergonomically designed knives and peelers, and faucet valves and knobs for temperature control. Medical supply stores and organizations are good sources of information about products that improve your quality of life and insure your safety.      Last Reviewed: November 2009 Lexx Griffiths MD   Updated: 3/7/2011

## 2022-11-11 ENCOUNTER — OFFICE VISIT (OUTPATIENT)
Dept: SURGERY | Age: 52
End: 2022-11-11
Payer: COMMERCIAL

## 2022-11-11 VITALS
RESPIRATION RATE: 18 BRPM | SYSTOLIC BLOOD PRESSURE: 103 MMHG | WEIGHT: 156 LBS | HEART RATE: 83 BPM | BODY MASS INDEX: 28.71 KG/M2 | DIASTOLIC BLOOD PRESSURE: 79 MMHG | HEIGHT: 62 IN

## 2022-11-11 DIAGNOSIS — Z12.11 ENCOUNTER FOR COLONOSCOPY IN PATIENT WITH FAMILY HISTORY OF COLON CANCER: Primary | ICD-10-CM

## 2022-11-11 DIAGNOSIS — Z80.0 ENCOUNTER FOR COLONOSCOPY IN PATIENT WITH FAMILY HISTORY OF COLON CANCER: Primary | ICD-10-CM

## 2022-11-11 PROCEDURE — G8427 DOCREV CUR MEDS BY ELIG CLIN: HCPCS | Performed by: SURGERY

## 2022-11-11 PROCEDURE — G8482 FLU IMMUNIZE ORDER/ADMIN: HCPCS | Performed by: SURGERY

## 2022-11-11 PROCEDURE — 99203 OFFICE O/P NEW LOW 30 MIN: CPT | Performed by: SURGERY

## 2022-11-11 PROCEDURE — G8419 CALC BMI OUT NRM PARAM NOF/U: HCPCS | Performed by: SURGERY

## 2022-11-11 PROCEDURE — 1036F TOBACCO NON-USER: CPT | Performed by: SURGERY

## 2022-11-11 PROCEDURE — 3017F COLORECTAL CA SCREEN DOC REV: CPT | Performed by: SURGERY

## 2022-11-11 NOTE — PROGRESS NOTES
[address] [facility] Surgery Clinic Note    Assessment/Plan:      Diagnosis Orders   1. Encounter for colonoscopy in patient with family history of colon cancer      Plan for colonoscopy            Return for Colonoscopy. Chief Complaint   Patient presents with    New Patient    Colonoscopy     Consult       PCP: Robin Mayorga MD    HPI: Mark Naranjo is a 47 y.o. female who presents in consultation for colonoscopy. She has not had one previously. She denies any issues. There is no problems with diarrhea or constipation. There is no melena or hematochezia. There is no abdominal pain or unintentional weight loss. There are no bowel caliber changes. Her mother has a history of colon cancer. There is no family history of inflammatory bowel disease. Past Medical History:   Diagnosis Date    Back pain     Cancer ([address] 7/2014    breast MCCLAIN    Gastroesophageal reflux disease without esophagitis 5/10/2019    Generalized anxiety disorder 5/10/2019    Migraine without aura and without status migrainosus, not intractable 5/10/2019    Mixed hyperlipidemia 5/10/2019    Osteopenia of multiple sites 5/10/2019    Other insomnia 5/10/2019    Other osteoporosis without current pathological fracture 5/10/2019    Peripheral autonomic neuropathy in disorders classified elsewhere 5/10/2019    Recurrent oral aphthae 5/10/2019       Past Surgical History:   Procedure Laterality Date    BREAST SURGERY Bilateral 8/2014    Mastectomy    BREAST SURGERY Bilateral 12/2014    Implant    EYE SURGERY Bilateral     lasik    FRACTURE SURGERY Right 2015    humerus, open reduction fixation shoulder    FRACTURE SURGERY  02/2016    removal of hardware    HYSTERECTOMY (CERVIX STATUS UNKNOWN)      MASTECTOMY Bilateral 8/19/2014    INSERTION OF TISSUE EXPANDERS    SHOULDER SURGERY Right 1/2015    WISDOM TOOTH EXTRACTION         Prior to Admission medications    Medication Sig Start Date End Date Taking?  Authorizing Provider   Multiple Vitamin (MULTI-VITAMIN PO) Take by mouth   Yes Historical Provider, MD   diphenhydrAMINE HCl (BENADRYL PO) Take by mouth nightly   Yes Historical Provider, MD   aspirin-acetaminophen-caffeine ([place]) 152-466-66 MG per tablet Take 1 tablet by mouth as needed for Headaches   Yes Historical Provider, MD   Melatonin 10 MG TABS Take by mouth nightly   Yes Historical Provider, MD       No Known Allergies    Social History     Socioeconomic History    Marital status:      Spouse name: None    Number of children: None    Years of education: None    Highest education level: None   Tobacco Use    Smoking status: Former     Packs/day: 0.50     Years: 8.00     Pack years: 4.00     Types: Cigarettes     Start date:  [address]     Quit date:      Years since quittin.8    Smokeless tobacco: Never   Vaping Use    Vaping Use: Never used   Substance and Sexual Activity    Alcohol use: No    Drug use: No       Family History   Problem Relation Age of Onset    Cancer Paternal Grandmother 80        breast     Cancer Paternal Grandfather 79        prostate    Breast Cancer Mother     Colon Cancer Mother     No Known Problems Father        Review of Systems   All other systems reviewed and are negative. Objective:  Vitals:    22 1018   BP: 103/79   Pulse: 83   Resp: 18   Weight: 156 lb (70.8 kg)   Height: 5' 2\" (1.575 m)          Physical Exam  HENT:      Head: Normocephalic and atraumatic. Eyes:      General:         Right eye: No discharge. Left eye: No discharge. Neck:      Trachea: No tracheal deviation. Cardiovascular:      Rate and Rhythm: Normal rate. Pulmonary:      Effort: Pulmonary effort is normal. No respiratory distress. Abdominal:      General: There is no distension. Palpations: Abdomen is soft. Tenderness: There is no abdominal tenderness. There is no guarding or rebound. Skin:     General: Skin is warm and dry.    Neurological:      Mental Status: She is alert and oriented to person, place, and time. Katherine Sam MD    I have examined the patient and performed the key aspects of physical exam, reviewed the record (including all pertinent and new radiology images and laboratory findings), and discussed the case with the primary and referring provider where applicable. NOTE: This report, in part or full,may have been transcribed using voice recognition software. Every effort was made to ensure accuracy; however, inadvertent computerized transcription errors may be present. Please excuse any transcriptional grammatical or spelling errors that may have escaped my editorial review.     CC: Leslee Morgan MD

## 2022-11-16 ENCOUNTER — PREP FOR PROCEDURE (OUTPATIENT)
Dept: SURGERY | Age: 52
End: 2022-11-16

## 2022-11-16 PROBLEM — Z12.11 SCREENING FOR COLON CANCER: Status: ACTIVE | Noted: 2022-11-16

## 2022-12-16 PROBLEM — Z12.11 SCREENING FOR COLON CANCER: Status: RESOLVED | Noted: 2022-11-16 | Resolved: 2022-12-16

## 2023-01-10 ENCOUNTER — TELEPHONE (OUTPATIENT)
Dept: SURGERY | Age: 53
End: 2023-01-10

## 2023-01-10 NOTE — TELEPHONE ENCOUNTER
Prior Authorization Form:      DEMOGRAPHICS:                     Patient Name:  Hoa Spell  Patient :  1970            Insurance:  Payor: [employer] / Plan: [employer] [address] / Product Type: *No Product type* /   Insurance ID Number:    Payer/Plan Subscr  Sex Relation Sub. Ins. ID Effective Group Num   1.  [address] 1970 Female Self [id number]                                   [address]         DIAGNOSIS & PROCEDURE:                       Procedure/Operation: colonoscopy           CPT Code: 00694    Diagnosis:  screening    ICD10 Code: z12.11    Location:  [facility]    Surgeon:  Emily Rowe INFORMATION:                          Date: 3/16/23    Time: 1000am              Anesthesia:  MAC/TIVA                                                       Status:  Outpatient        Special Comments:         Electronically signed by Maria Elena Beverly MA on 1/10/2023 at 10:39 AM

## 2023-01-10 NOTE — TELEPHONE ENCOUNTER
Leonie Plata is scheduled for colonoscopy with Dr Maegan Tucker on 3/16/23 at 1000am. Patient was told to arrive at 900am. Patient needs to be NPO after midnight the night before procedure. All surgery instructions were explained to the patient and a surgery letter was also mailed out. MA informed patient that PAT will also be calling to review pre-op instructions and medications. Patient verbalized understanding.

## 2023-03-14 RX ORDER — PHENOL 1.4 %
1 AEROSOL, SPRAY (ML) MUCOUS MEMBRANE DAILY
COMMUNITY

## 2023-03-14 RX ORDER — CALCIUM GLUCONATE 45(500) MG
500 TABLET ORAL DAILY
COMMUNITY
End: 2023-03-14

## 2023-03-14 NOTE — PROGRESS NOTES
[facility] PRE-ADMISSION TESTING INSTRUCTIONS    The Preadmission Testing patient is instructed accordingly using the following criteria (check applicable):    ARRIVAL INSTRUCTIONS:  [x] Parking the day of Surgery is located in the Main Entrance lot. Upon entering the door, make an immediate right to the surgery reception desk    [x] Bring photo ID and insurance card    [] Bring in a copy of Living will or Durable Power of  papers. [x] Please be sure to arrange for responsible adult to provide transportation to and from the hospital    [x] Please arrange for responsible adult to be with you for the 24 hour period post procedure due to having anesthesia    [x] If you awake am of surgery not feeling well or have temperature >100 please call [phone]    GENERAL INSTRUCTIONS:    [x] Nothing by mouth after midnight, including gum, candy, mints or water    [x] You may brush your teeth, but do not swallow any water    [] Take medications as instructed with 1-2 oz of water    [x] Stop herbal supplements and vitamins 5 days prior to procedure    [] Follow preop dosing of blood thinners per physician instructions    [] Take 1/2 dose of evening insulin, but no insulin after midnight    [] No oral diabetic medications after midnight    [] If diabetic and have low blood sugar or feel symptomatic, take 1-2oz apple juice only    [] Bring inhalers day of surgery    [] Bring C-PAP/ Bi-Pap day of surgery    [] Bring urine specimen day of surgery    [x] Shower or bath with soap, lather and rinse well, AM of Surgery, no lotion, powders or creams to surgical site    [x] Follow bowel prep as instructed per surgeon    [] No tobacco products within 24 hours of surgery     [x] No alcohol or illegal drug use within 24 hours of surgery.     [x] Jewelry, body piercing's, eyeglasses, contact lenses and dentures are not permitted into surgery (bring cases)      [x] Please do not wear any nail polish, make up or hair products on the day of surgery    [x] You can expect a call the business day prior to procedure to notify you if your arrival time changes    [x] If you receive a survey after surgery we would greatly appreciate your comments    [] Parent/guardian of a minor must accompany their child and remain on the premises  the entire time they are under our care     [] Pediatric patients may bring favorite toy, blanket or comfort item with them    [] A caregiver or family member must remain with the patient during their stay if they are mentally handicapped, have dementia, disoriented or unable to use a call light or would be a safety concern if left unattended    [x] Please notify surgeon if you develop any illness between now and time of surgery (cold, cough, sore throat, fever, nausea, vomiting) or any signs of infections  including skin, wounds, and dental.    []  The Outpatient Pharmacy is available to fill your prescription here on your day of surgery, ask your preop nurse for details    [] Other instructions    EDUCATIONAL MATERIALS PROVIDED:    [] PAT Preoperative Education Packet/Booklet     [] Medication List    [] Transfusion bracelet applied with instructions    [] Shower with soap, lather and rinse well, and use CHG wipes provided the evening before surgery as instructed    [] Incentive spirometer with instructions

## 2023-03-16 ENCOUNTER — HOSPITAL ENCOUNTER (OUTPATIENT)
Age: 53
Setting detail: OUTPATIENT SURGERY
Discharge: HOME | End: 2023-03-16
Attending: SURGERY | Admitting: SURGERY
Payer: COMMERCIAL

## 2023-03-16 ENCOUNTER — ANESTHESIA EVENT (OUTPATIENT)
Dept: ENDOSCOPY | Age: 53
End: 2023-03-16
Payer: COMMERCIAL

## 2023-03-16 ENCOUNTER — ANESTHESIA (OUTPATIENT)
Dept: ENDOSCOPY | Age: 53
End: 2023-03-16
Payer: COMMERCIAL

## 2023-03-16 VITALS
SYSTOLIC BLOOD PRESSURE: 107 MMHG | OXYGEN SATURATION: 99 % | WEIGHT: 158 LBS | DIASTOLIC BLOOD PRESSURE: 65 MMHG | HEART RATE: 68 BPM | BODY MASS INDEX: 29.08 KG/M2 | TEMPERATURE: 97.3 F | RESPIRATION RATE: 18 BRPM | HEIGHT: 62 IN

## 2023-03-16 DIAGNOSIS — Z12.11 SCREENING FOR COLON CANCER: ICD-10-CM

## 2023-03-16 PROCEDURE — 3700000001 HC ADD 15 MINUTES (ANESTHESIA): Performed by: SURGERY

## 2023-03-16 PROCEDURE — 7100000011 HC PHASE II RECOVERY - ADDTL 15 MIN: Performed by: SURGERY

## 2023-03-16 PROCEDURE — 7100000010 HC PHASE II RECOVERY - FIRST 15 MIN: Performed by: SURGERY

## 2023-03-16 PROCEDURE — 2709999900 HC NON-CHARGEABLE SUPPLY: Performed by: SURGERY

## 2023-03-16 PROCEDURE — 6360000002 HC RX W HCPCS: Performed by: NURSE ANESTHETIST, CERTIFIED REGISTERED

## 2023-03-16 PROCEDURE — 88305 TISSUE EXAM BY PATHOLOGIST: CPT

## 2023-03-16 PROCEDURE — 3700000000 HC ANESTHESIA ATTENDED CARE: Performed by: SURGERY

## 2023-03-16 PROCEDURE — 3609010600 HC COLONOSCOPY POLYPECTOMY SNARE/COLD BIOPSY: Performed by: SURGERY

## 2023-03-16 PROCEDURE — 2580000003 HC RX 258: Performed by: NURSE ANESTHETIST, CERTIFIED REGISTERED

## 2023-03-16 PROCEDURE — 45385 COLONOSCOPY W/LESION REMOVAL: CPT | Performed by: SURGERY

## 2023-03-16 RX ORDER — PROPOFOL 10 MG/ML
INJECTION, EMULSION INTRAVENOUS PRN
Status: DISCONTINUED | OUTPATIENT
Start: 2023-03-16 | End: 2023-03-16 | Stop reason: SDUPTHER

## 2023-03-16 RX ORDER — SODIUM CHLORIDE 9 MG/ML
INJECTION, SOLUTION INTRAVENOUS CONTINUOUS PRN
Status: DISCONTINUED | OUTPATIENT
Start: 2023-03-16 | End: 2023-03-16 | Stop reason: SDUPTHER

## 2023-03-16 RX ADMIN — PROPOFOL 20 MG: 10 INJECTION, EMULSION INTRAVENOUS at 09:54

## 2023-03-16 RX ADMIN — PROPOFOL 50 MG: 10 INJECTION, EMULSION INTRAVENOUS at 09:59

## 2023-03-16 RX ADMIN — PROPOFOL 40 MG: 10 INJECTION, EMULSION INTRAVENOUS at 10:07

## 2023-03-16 RX ADMIN — PROPOFOL 100 MG: 10 INJECTION, EMULSION INTRAVENOUS at 09:52

## 2023-03-16 RX ADMIN — SODIUM CHLORIDE: 9 INJECTION, SOLUTION INTRAVENOUS at 09:49

## 2023-03-16 RX ADMIN — PROPOFOL 50 MG: 10 INJECTION, EMULSION INTRAVENOUS at 10:04

## 2023-03-16 RX ADMIN — PROPOFOL 80 MG: 10 INJECTION, EMULSION INTRAVENOUS at 09:55

## 2023-03-16 ASSESSMENT — PAIN DESCRIPTION - PAIN TYPE
TYPE: ACUTE PAIN

## 2023-03-16 ASSESSMENT — PAIN DESCRIPTION - LOCATION
LOCATION: ABDOMEN

## 2023-03-16 ASSESSMENT — PAIN - FUNCTIONAL ASSESSMENT: PAIN_FUNCTIONAL_ASSESSMENT: 0-10

## 2023-03-16 ASSESSMENT — PAIN DESCRIPTION - ORIENTATION
ORIENTATION: MID

## 2023-03-16 ASSESSMENT — PAIN DESCRIPTION - DESCRIPTORS: DESCRIPTORS: CRAMPING

## 2023-03-16 ASSESSMENT — PAIN SCALES - GENERAL
PAINLEVEL_OUTOF10: 1
PAINLEVEL_OUTOF10: 0

## 2023-03-16 ASSESSMENT — LIFESTYLE VARIABLES: SMOKING_STATUS: 0

## 2023-03-16 NOTE — H&P
[address] [facility] Surgery Clinic Note     Assessment/Plan:        Diagnosis Orders   1. Encounter for colonoscopy in patient with family history of colon cancer        Plan for colonoscopy                Return for Colonoscopy. Chief Complaint   Patient presents with    New Patient    Colonoscopy       Consult         PCP: Luis Fernando Marques MD     HPI: Vidya Vences is a 47 y.o. female who presents in consultation for colonoscopy. She has not had one previously. She denies any issues. There is no problems with diarrhea or constipation. There is no melena or hematochezia. There is no abdominal pain or unintentional weight loss. There are no bowel caliber changes. Her mother has a history of colon cancer. There is no family history of inflammatory bowel disease.         Past Medical History        Past Medical History:   Diagnosis Date    Back pain      Cancer ([address] 7/2014     breast MCCLAIN    Gastroesophageal reflux disease without esophagitis 5/10/2019    Generalized anxiety disorder 5/10/2019    Migraine without aura and without status migrainosus, not intractable 5/10/2019    Mixed hyperlipidemia 5/10/2019    Osteopenia of multiple sites 5/10/2019    Other insomnia 5/10/2019    Other osteoporosis without current pathological fracture 5/10/2019    Peripheral autonomic neuropathy in disorders classified elsewhere 5/10/2019    Recurrent oral aphthae 5/10/2019            Past Surgical History         Past Surgical History:   Procedure Laterality Date    BREAST SURGERY Bilateral 8/2014     Mastectomy    BREAST SURGERY Bilateral 12/2014     Implant    EYE SURGERY Bilateral       lasik    FRACTURE SURGERY Right 2015     humerus, open reduction fixation shoulder    FRACTURE SURGERY   02/2016     removal of hardware    HYSTERECTOMY (CERVIX STATUS UNKNOWN)        MASTECTOMY Bilateral 8/19/2014     INSERTION OF TISSUE EXPANDERS    SHOULDER SURGERY Right 1/2015    [address] [place] [address] Medications           Prior to Admission medications    Medication Sig Start Date End Date Taking? Authorizing Provider   Multiple Vitamin (MULTI-VITAMIN PO) Take by mouth     Yes Historical Provider, MD   diphenhydrAMINE HCl (BENADRYL PO) Take by mouth nightly     Yes Historical Provider, MD   aspirin-acetaminophen-caffeine ([place]) 794-875-77 MG per tablet Take 1 tablet by mouth as needed for Headaches     Yes Historical Provider, MD   Melatonin 10 MG TABS Take by mouth nightly     Yes Historical Provider, MD            No Known Allergies     Social History   Social History            Socioeconomic History    Marital status:        Spouse name: None    Number of children: None    Years of education: None    Highest education level: None   Tobacco Use    Smoking status: Former       Packs/day: 0.50       Years: 8.00       Pack years: 4.00       Types: Cigarettes       Start date:        Quit date:        Years since quittin.8    Smokeless tobacco: Never   Vaping Use    Vaping Use: Never used   Substance and Sexual Activity    Alcohol use: No    Drug use: No            Family History         Family History   Problem Relation Age of Onset    Cancer Paternal Grandmother 80         breast     Cancer Paternal Grandfather 79         prostate    Breast Cancer Mother      Colon Cancer Mother      No Known Problems Father              Review of Systems   All other systems reviewed and are negative. Objective:  Vitals       Vitals:     22 1018   BP: 103/79   Pulse: 83   Resp: 18   Weight: 156 lb (70.8 kg)   Height: 5' 2\" (1.575 m)            Physical Exam  HENT:      Head: Normocephalic and atraumatic. Eyes:      General:         Right eye: No discharge. Left eye: No discharge. Neck:      Trachea: No tracheal deviation. Cardiovascular:      Rate and Rhythm: Normal rate. Pulmonary:      Effort: Pulmonary effort is normal. No respiratory distress.    Abdominal: General: There is no distension. Palpations: Abdomen is soft. Tenderness: There is no abdominal tenderness. There is no guarding or rebound. Skin:     General: Skin is warm and dry. Neurological:      Mental Status: She is alert and oriented to person, place, and time. Haley White MD     I have examined the patient and performed the key aspects of physical exam, reviewed the record (including all pertinent and new radiology images and laboratory findings), and discussed the case with the primary and referring provider where applicable. NOTE: This report, in part or full,may have been transcribed using voice recognition software. Every effort was made to ensure accuracy; however, inadvertent computerized transcription errors may be present. Please excuse any transcriptional grammatical or spelling errors that may have escaped my editorial review.      CC: Andie Werner MD

## 2023-03-16 NOTE — ANESTHESIA POSTPROCEDURE EVALUATION
Department of Anesthesiology  Postprocedure Note    Patient: Amanda Santiago  MRN: [id number]  YOB: 1970  Date of evaluation: 3/16/2023      Procedure Summary     Date: 03/16/23 Room / Location: [place] [address] / [facility]    Anesthesia Start: 8047 Anesthesia Stop:     Procedure: COLONOSCOPY POLYPECTOMY SNARE/COLD BIOPSY Diagnosis:       Screening for colon cancer      (Screening for colon cancer [Z12.11])    Surgeons: Deo Hernandez MD Responsible Provider: Shayla Harden MD    Anesthesia Type: MAC ASA Status: 3          Anesthesia Type: No value filed.     Familia Phase I: Familia Score: 10    Familia Phase II:        Anesthesia Post Evaluation    Patient location during evaluation: PACU  Patient participation: complete - patient participated  Level of consciousness: awake  Airway patency: patent  Nausea & Vomiting: no nausea and no vomiting  Complications: no  Cardiovascular status: hemodynamically stable  Respiratory status: acceptable  Hydration status: euvolemic

## 2023-03-16 NOTE — PROGRESS NOTES
PT RECEIVED IN PACU 2 FROM ENDO POST COLONOSCOPY  AT BEDSIDE AND UPDATED, REPORT RECEIVED PT DROWSY AWAKE RESPONDS EASILY STATES SLIGHT CRAMPING , ENCOURAGED TO PASS FLATUS AND JUST STARTED DOING SO  1040 PT PASSING FLATUS STARTED CLEAR LIQUIDS WAITING TO SPEAK WITH    1050 DR BURTON HERE AND SPOKE WITH PT AND  AND UPDATED ON PROCEDURE AND WHEN TO FOLLOW UP   1100 DISCHARGE INSTRUCTIONS GIVEN TO PT AND HER  AT THIS TIME BOTH VERBALIZED UNDERSTANDING OF INSTRUCTIONS PAMPHLETS GIVEN

## 2023-03-16 NOTE — OP NOTE
Colonoscopy Op Note  PATIENT: Arabella Jackson    DATE OF PROCEDURE: 3/16/2023    SURGEON: Laveta Barthel, MD    PREOPERATIVE DIAGNOSIS:  Family history of colon cancer    POSTOPERATIVE DIAGNOSIS: Same, Small AVM x2,colon polyp, diverticulosis, grade 1 hemorrhoids    OPERATION: Procedure(s):  COLONOSCOPY POLYPECTOMY SNARE/COLD BIOPSY    ANESTHESIA: Local monitored anesthesia. ESTIMATED BLOOD LOSS: nil     COMPLICATIONS: None. SPECIMENS:   ID Type Source Tests Collected by Time Destination   A : RECTO SIGMOID POLYPECTOMY Tissue Tissue SURGICAL PATHOLOGY Laveta Barthel, MD 3/16/2023 1008        HISTORY: The patient is a 47y.o. year old female with history of above preop diagnosis. I recommended colonoscopy with possible biopsy or polypectomy and I explained the risk, benefits, expected outcome, and alternatives to the procedure. Risks included but are not limited to bleeding, infection, respiratory distress, hypotension, and perforation of the colon. The patient understands and is in agreement. PROCEDURE: The patient was given IV conscious sedation per anesthesia. The patient was given supplemental oxygen by nasal cannula. The colonoscope was inserted per rectum and advanced under direct vision to the cecum without difficulty, identified by appendiceal orifice and ileocecal valve. The prep was good so exam was adequate. FINDINGS:    MEMO: normal    Terminal Ileum: normal    Colon: There are 2 diminutive AVMs near the hepatic flexure that were nonbleeding. In the rectosigmoid there is a 3 mm polyp status post cold snare polypectomy. There is diverticulosis. Rectum/Anus: examined in normal and retroflexed positions -grade 1 hemorrhoids    The colon was decompressed and the scope was removed. The withdraw time was approximately 9 minutes. The patient tolerated the procedure well.      ASSESSMENT/PLAN:   Follow-up pathology  Fiber diet  Colorectal Cancer Screening - recommend repeat colonoscopy in 5 years (may change pending biopsy results). Sooner if issues/concerns.     Deo Hernandez MD  03/16/23  10:09 AM

## 2023-03-16 NOTE — ANESTHESIA PRE PROCEDURE
Department of Anesthesiology  Preprocedure Note       Name:  Israel Blanco   Age:  47 y.o.  :  1970                                          MRN:  [id number]         Date:  3/16/2023      Surgeon: Dione Brown):  Nina Manning MD    Procedure: Procedure(s):  COLORECTAL CANCER SCREENING, NOT HIGH RISK    Medications prior to admission:   Prior to Admission medications    Medication Sig Start Date End Date Taking? Authorizing Provider   calcium carbonate 600 MG TABS tablet Take 1 tablet by mouth daily With vit d   Yes Historical Provider, MD   Multiple Vitamin (MULTI-VITAMIN PO) Take by mouth    Historical Provider, MD   diphenhydrAMINE HCl (BENADRYL PO) Take by mouth nightly    Historical Provider, MD   aspirin-acetaminophen-caffeine (Harles Florien) 203-316-74 MG per tablet Take 1 tablet by mouth as needed for Headaches    Historical Provider, MD   Melatonin 10 MG TABS Take by mouth nightly    Historical Provider, MD       Current medications:    No current facility-administered medications for this encounter. Allergies:  No Known Allergies    Problem List:    Patient Active Problem List   Diagnosis Code    Carcinoma in situ of breast D05.90    S/P bilateral mastectomy Z90.13    Breast cancer, left breast ([facility] [address] C50.912    Closed right humeral fracture S42.301A    Personal history of breast cancer Z85.3    Retained orthopedic hardware Z96.9    Closed fracture of four ribs of left side S22.42XA    Multiple closed fractures of ribs of left side S22.42XA    Pneumothorax, traumatic S27. 0XXA    Migraine without aura and without status migrainosus, not intractable G43.009    Generalized anxiety disorder F41.1    Vasomotor symptoms due to menopause N95.1    Other insomnia G47.09    Osteopenia of multiple sites M85.89    Vitamin D deficiency E55.9    Peripheral autonomic neuropathy in disorders classified elsewhere G99.0    Mixed hyperlipidemia E78.2    Gastroesophageal reflux disease without esophagitis K21.9    Genital pruritus L29.3    Recurrent oral aphthae K12.0    Influenza J11.1       Past Medical History:        Diagnosis Date    Back pain     Cancer ([address] 2014    breast [place]    Family history of colon cancer     Gastroesophageal reflux disease without esophagitis 05/10/2019    Generalized anxiety disorder 05/10/2019    Migraine without aura and without status migrainosus, not intractable 05/10/2019    Mixed hyperlipidemia 05/10/2019    Osteopenia of multiple sites 05/10/2019    Other insomnia 05/10/2019    Other osteoporosis without current pathological fracture 05/10/2019    Peripheral autonomic neuropathy in disorders classified elsewhere 05/10/2019    Recurrent oral aphthae 05/10/2019    Screen for colon cancer        Past Surgical History:        Procedure Laterality Date    BREAST SURGERY Bilateral 2014    Mastectomy    BREAST SURGERY Bilateral 2014    Implant    EYE SURGERY Bilateral     lasik    FRACTURE SURGERY Right     humerus, open reduction fixation shoulder    FRACTURE SURGERY  2016    removal of hardware    HYSTERECTOMY (CERVIX STATUS UNKNOWN)      MASTECTOMY Bilateral 2014    INSERTION OF TISSUE EXPANDERS    SHOULDER SURGERY Right 2015    WISDOM TOOTH EXTRACTION         Social History:    Social History     Tobacco Use    Smoking status: Former     Packs/day: 0.50     Years: 8.00     Pack years: 4.00     Types: Cigarettes     Start date:      Quit date:      Years since quittin.2    Smokeless tobacco: Never   Substance Use Topics    Alcohol use: Yes     Comment: rarely                                Counseling given: Not Answered      Vital Signs (Current):   Vitals:    23 1209 23 0910   BP:  116/66   Pulse:  79   Resp:  16   Temp:  97.5 °F (36.4 °C)   SpO2:  96%   Weight: 158 lb (71.7 kg)    Height: 5' 1.5\" (1.562 m)                                               BP Readings from Last 3 Encounters:   03/16/23 116/66   11/11/22 103/79   10/12/22 96/74       NPO Status: Time of last liquid consumption: 2200                        Time of last solid consumption: 1900                        Date of last liquid consumption: 03/15/23                        Date of last solid food consumption: 03/14/23    BMI:   Wt Readings from Last 3 Encounters:   03/14/23 158 lb (71.7 kg)   11/11/22 156 lb (70.8 kg)   10/12/22 154 lb (69.9 kg)     Body mass index is 29.37 kg/m². CBC:   Lab Results   Component Value Date/Time    WBC 5.2 09/24/2021 08:09 AM    RBC 4.33 09/24/2021 08:09 AM    HGB 12.8 09/24/2021 08:09 AM    HCT 39.2 09/24/2021 08:09 AM    MCV 90.5 09/24/2021 08:09 AM    RDW 13.2 09/24/2021 08:09 AM     09/24/2021 08:09 AM       CMP:   Lab Results   Component Value Date/Time     09/24/2021 08:09 AM    K 4.3 09/24/2021 08:09 AM    K 4.5 10/28/2018 04:35 AM     09/24/2021 08:09 AM    CO2 23 09/24/2021 08:09 AM    BUN 12 09/24/2021 08:09 AM    CREATININE 0.8 09/24/2021 08:09 AM    GFRAA >60 09/24/2021 08:09 AM    LABGLOM >60 09/24/2021 08:09 AM    GLUCOSE 97 09/24/2021 08:09 AM    PROT 7.1 09/24/2021 08:09 AM    CALCIUM 9.4 09/24/2021 08:09 AM    BILITOT 0.2 09/24/2021 08:09 AM    ALKPHOS 63 09/24/2021 08:09 AM    AST 13 09/24/2021 08:09 AM    ALT 10 09/24/2021 08:09 AM       POC Tests: No results for input(s): POCGLU, POCNA, POCK, POCCL, POCBUN, POCHEMO, POCHCT in the last 72 hours.     Coags:   Lab Results   Component Value Date/Time    PROTIME 13.0 10/25/2018 06:21 PM    INR 1.1 10/25/2018 06:21 PM    APTT 23.6 10/25/2018 06:21 PM       HCG (If Applicable):   Lab Results   Component Value Date    PREGTESTUR NEGATIVE 08/19/2014        ABGs: No results found for: PHART, PO2ART, BSC1XBX, VGN3TYC, BEART, W7DFEKHA     Type & Screen (If Applicable):  No results found for: LABABO, LABRH    Drug/Infectious Status (If Applicable):  No results found for: HIV, HEPCAB    COVID-19 Screening (If Applicable): No results found for: COVID19        Anesthesia Evaluation  Patient summary reviewed no history of anesthetic complications:   Airway: Mallampati: II  TM distance: >3 FB   Neck ROM: full  Mouth opening: > = 3 FB   Dental: normal exam         Pulmonary:Negative Pulmonary ROS breath sounds clear to auscultation      (-) not a current smoker                           Cardiovascular:Negative CV ROS            Rhythm: regular  Rate: normal                    Neuro/Psych:   (+) neuromuscular disease (Back pain):, headaches: migraine headaches, depression/anxiety  (Generalized anxiety disorder)             ROS comment: Peripheral autonomic neuropathy  GI/Hepatic/Renal:   (+) GERD:, bowel prep,          ROS comment: Screening for colon cancer (Z12.11). Endo/Other:    (+) malignancy/cancer (Breast - Left). Abdominal:             Vascular: negative vascular ROS. Other Findings:           Anesthesia Plan      MAC     ASA 3       Induction: intravenous. Anesthetic plan and risks discussed with patient. Plan discussed with CRNA.                 Patient seen and evaluated LAURITA Beck MD   3/16/2023

## 2023-03-28 ENCOUNTER — TELEPHONE (OUTPATIENT)
Dept: SURGERY | Age: 53
End: 2023-03-28

## 2023-03-28 NOTE — TELEPHONE ENCOUNTER
MA informed patient that pathology showed precancerous polyps, repeat colonoscopy in 3 years.  Patient understood information given  Electronically signed by Tatiana Handley MA on 3/28/2023 at 3:55 PM

## 2023-03-28 NOTE — TELEPHONE ENCOUNTER
----- Message from Haja Miles MA sent at 2/10/2023  3:19 PM EST -----  Regarding: follow up colon  Not urgent. Patient is having colon on 3/16. Has a f/u sched on 4/7. She works for the "[employer] and has a very hard time making dr alvarado. She asked that if her path is normal, we can just call her and let her know. If the path is not normal, she will make arrangements to come in on 4/7.     Thank you!!!!!

## 2023-08-22 ENCOUNTER — OFFICE VISIT (OUTPATIENT)
Dept: FAMILY MEDICINE CLINIC | Age: 53
End: 2023-08-22
Payer: COMMERCIAL

## 2023-08-22 ENCOUNTER — TELEPHONE (OUTPATIENT)
Dept: FAMILY MEDICINE CLINIC | Age: 53
End: 2023-08-22

## 2023-08-22 VITALS
OXYGEN SATURATION: 96 % | TEMPERATURE: 98.5 F | HEIGHT: 62 IN | DIASTOLIC BLOOD PRESSURE: 78 MMHG | HEART RATE: 78 BPM | RESPIRATION RATE: 20 BRPM | BODY MASS INDEX: 29.44 KG/M2 | SYSTOLIC BLOOD PRESSURE: 100 MMHG | WEIGHT: 160 LBS

## 2023-08-22 DIAGNOSIS — R42 DIZZINESS: Primary | ICD-10-CM

## 2023-08-22 PROCEDURE — 99213 OFFICE O/P EST LOW 20 MIN: CPT | Performed by: INTERNAL MEDICINE

## 2023-08-22 PROCEDURE — G8419 CALC BMI OUT NRM PARAM NOF/U: HCPCS | Performed by: INTERNAL MEDICINE

## 2023-08-22 PROCEDURE — 1036F TOBACCO NON-USER: CPT | Performed by: INTERNAL MEDICINE

## 2023-08-22 PROCEDURE — 3017F COLORECTAL CA SCREEN DOC REV: CPT | Performed by: INTERNAL MEDICINE

## 2023-08-22 PROCEDURE — G8427 DOCREV CUR MEDS BY ELIG CLIN: HCPCS | Performed by: INTERNAL MEDICINE

## 2023-08-22 RX ORDER — LORATADINE 10 MG/1
10 TABLET ORAL DAILY
COMMUNITY

## 2023-08-22 RX ORDER — OFLOXACIN 3 MG/ML
5 SOLUTION AURICULAR (OTIC) 2 TIMES DAILY
Qty: 5 ML | Refills: 0 | Status: SHIPPED | OUTPATIENT
Start: 2023-08-22 | End: 2023-09-01

## 2023-08-22 RX ORDER — MECLIZINE HCL 12.5 MG/1
12.5 TABLET ORAL 3 TIMES DAILY PRN
Qty: 30 TABLET | Refills: 0 | Status: SHIPPED | OUTPATIENT
Start: 2023-08-22 | End: 2023-09-01

## 2023-08-22 SDOH — ECONOMIC STABILITY: FOOD INSECURITY: WITHIN THE PAST 12 MONTHS, YOU WORRIED THAT YOUR FOOD WOULD RUN OUT BEFORE YOU GOT MONEY TO BUY MORE.: PATIENT DECLINED

## 2023-08-22 SDOH — ECONOMIC STABILITY: INCOME INSECURITY: HOW HARD IS IT FOR YOU TO PAY FOR THE VERY BASICS LIKE FOOD, HOUSING, MEDICAL CARE, AND HEATING?: PATIENT DECLINED

## 2023-08-22 SDOH — ECONOMIC STABILITY: FOOD INSECURITY: WITHIN THE PAST 12 MONTHS, THE FOOD YOU BOUGHT JUST DIDN'T LAST AND YOU DIDN'T HAVE MONEY TO GET MORE.: PATIENT DECLINED

## 2023-08-22 SDOH — ECONOMIC STABILITY: HOUSING INSECURITY
IN THE LAST 12 MONTHS, WAS THERE A TIME WHEN YOU DID NOT HAVE A STEADY PLACE TO SLEEP OR SLEPT IN A SHELTER (INCLUDING NOW)?: PATIENT REFUSED

## 2023-08-22 NOTE — TELEPHONE ENCOUNTER
GE calling in to verify quantity and day supply of ear drops. A 10 day supply will need 10ml not 5ml. Please advise  Medication  ofloxacin (FLOXIN) 0.3 % otic solution [66077]  ofloxacin (FLOXIN) 0.3 % otic solution [[id number]]     Order Details  Dose: 5 drop Route:  Both Ears Frequency: 2 TIMES DAILY   Dispense Quantity: 5 mL Refills: 0          Sig: Place 5 drops into both ears 2 times daily for 10 days

## 2023-08-22 NOTE — PROGRESS NOTES
8/22/23  Arabellaestefanía Jackson 1970 female 47 y.o. Chief Complaint   Patient presents with    Dizziness     Started 2 weeks ago - not getting better. Happens when she exercises, bends over, move too fast       HPI:  The patient states that they have been dizzy for the last 2 weeks. States initially started after exercising. States was doing sit up and began to have dizziness. States another episode at work at [employer], felt room tilted on her and lost balance. No LOC. States sudden movements make worse. The dizziness is worse with movement. No nausea and vomiting. States palpitations but gets anxious. No numbness, tingling, or weakness to the extremities. States headaches but has chronic headaches. States feesl flushed in the face. States has fatigue. No visual disturbances. No recent head injury. Bowel movements have been normal color and consistency. No diarrhea. No black or bloody stools. The patient denies dysuria, urinary frequency, urgency and or gross hematuria. No flank pain. No fevers or chills. No chest pain or dyspnea. No recent upper respiratory symptoms (sore throat, ear pain, cough, congestion), No Edema. They come to the urgent care for evaluation. ROS:  Const: Positives and pertinent negatives as per HPI. All others reviewed and are negative.         Current Outpatient Medications:     CALCIUM PO, Calcium Active October 18th, 2021 12:38pm, Disp: , Rfl:     loratadine (CLARITIN) 10 MG tablet, Take 1 tablet by mouth daily, Disp: , Rfl:     meclizine (ANTIVERT) 12.5 MG tablet, Take 1 tablet by mouth 3 times daily as needed for Dizziness, Disp: 30 tablet, Rfl: 0    ofloxacin (FLOXIN) 0.3 % otic solution, Place 5 drops into both ears 2 times daily for 10 days, Disp: 5 mL, Rfl: 0    calcium carbonate 600 MG TABS tablet, Take 1 tablet by mouth daily With vit d, Disp: , Rfl:     Multiple Vitamin (MULTI-VITAMIN PO), Take by mouth, Disp: , Rfl:     diphenhydrAMINE HCl (BENADRYL PO), Take

## 2023-10-18 ENCOUNTER — OFFICE VISIT (OUTPATIENT)
Dept: PRIMARY CARE CLINIC | Age: 53
End: 2023-10-18
Payer: COMMERCIAL

## 2023-10-18 VITALS
OXYGEN SATURATION: 98 % | BODY MASS INDEX: 29.4 KG/M2 | HEIGHT: 62 IN | HEART RATE: 74 BPM | SYSTOLIC BLOOD PRESSURE: 102 MMHG | DIASTOLIC BLOOD PRESSURE: 58 MMHG | WEIGHT: 159.8 LBS | TEMPERATURE: 98.6 F

## 2023-10-18 DIAGNOSIS — Z00.00 ENCOUNTER FOR WELL ADULT EXAM WITHOUT ABNORMAL FINDINGS: Primary | ICD-10-CM

## 2023-10-18 DIAGNOSIS — L29.3 GENITAL PRURITUS: ICD-10-CM

## 2023-10-18 DIAGNOSIS — M81.0 AGE-RELATED OSTEOPOROSIS WITHOUT CURRENT PATHOLOGICAL FRACTURE: ICD-10-CM

## 2023-10-18 DIAGNOSIS — E78.2 MIXED HYPERLIPIDEMIA: ICD-10-CM

## 2023-10-18 DIAGNOSIS — R73.9 HYPERGLYCEMIA: ICD-10-CM

## 2023-10-18 DIAGNOSIS — K21.9 GASTROESOPHAGEAL REFLUX DISEASE WITHOUT ESOPHAGITIS: ICD-10-CM

## 2023-10-18 DIAGNOSIS — G47.09 OTHER INSOMNIA: ICD-10-CM

## 2023-10-18 DIAGNOSIS — E55.9 VITAMIN D DEFICIENCY: ICD-10-CM

## 2023-10-18 PROCEDURE — G8484 FLU IMMUNIZE NO ADMIN: HCPCS | Performed by: INTERNAL MEDICINE

## 2023-10-18 PROCEDURE — 99396 PREV VISIT EST AGE 40-64: CPT | Performed by: INTERNAL MEDICINE

## 2023-10-18 ASSESSMENT — PATIENT HEALTH QUESTIONNAIRE - PHQ9
2. FEELING DOWN, DEPRESSED OR HOPELESS: NOT AT ALL
SUM OF ALL RESPONSES TO PHQ QUESTIONS 1-9: 0
SUM OF ALL RESPONSES TO PHQ QUESTIONS 1-9: 0
2. FEELING DOWN, DEPRESSED OR HOPELESS: 0
1. LITTLE INTEREST OR PLEASURE IN DOING THINGS: 0
1. LITTLE INTEREST OR PLEASURE IN DOING THINGS: NOT AT ALL
SUM OF ALL RESPONSES TO PHQ9 QUESTIONS 1 & 2: 0
SUM OF ALL RESPONSES TO PHQ QUESTIONS 1-9: 0
SUM OF ALL RESPONSES TO PHQ9 QUESTIONS 1 & 2: 0
SUM OF ALL RESPONSES TO PHQ QUESTIONS 1-9: 0

## 2023-10-18 ASSESSMENT — ENCOUNTER SYMPTOMS
VOMITING: 0
RHINORRHEA: 0
EYE PAIN: 0
COUGH: 0
SHORTNESS OF BREATH: 0
CHEST TIGHTNESS: 0
DIARRHEA: 0
CONSTIPATION: 0
BLOOD IN STOOL: 0
ABDOMINAL PAIN: 0
NAUSEA: 0
SORE THROAT: 0

## 2023-10-18 NOTE — PROGRESS NOTES
Multiple Vitamin (MULTI-VITAMIN PO) Take by mouth Yes Provider, Historical, MD   diphenhydrAMINE HCl (BENADRYL PO) Take by mouth nightly Yes Provider, MD Petyt   aspirin-acetaminophen-caffeine ([place]) [id number] MG per tablet Take 1 tablet by mouth as needed for Headaches Yes Provider, MD Petty   Melatonin 10 MG TABS Take by mouth nightly Yes Provider, MD Petty         Past Medical History:   Diagnosis Date    Back pain     Cancer ([address]) 07/2014    breast Annabelle Nishant    Family history of colon cancer 2023    Gastroesophageal reflux disease without esophagitis 05/10/2019    Generalized anxiety disorder 05/10/2019    Migraine without aura and without status migrainosus, not intractable 05/10/2019    Mixed hyperlipidemia 05/10/2019    Osteopenia of multiple sites 05/10/2019    Other insomnia 05/10/2019    Other osteoporosis without current pathological fracture 05/10/2019    Peripheral autonomic neuropathy in disorders classified elsewhere 05/10/2019    Recurrent oral aphthae 05/10/2019    Screen for colon cancer 2023       Past Surgical History:   Procedure Laterality Date    BREAST SURGERY Bilateral 08/2014    Mastectomy    BREAST SURGERY Bilateral 12/2014    Implant    COLONOSCOPY N/A 03/16/2023    COLONOSCOPY POLYPECTOMY SNARE/COLD BIOPSY performed by Trini Coyle MD at [address] [place] [address],[address] Bilateral     lasik    FRACTURE SURGERY Right 2015    humerus, open reduction fixation shoulder    FRACTURE SURGERY  02/2016    removal of hardware    HYSTERECTOMY (CERVIX STATUS UNKNOWN)      HYSTERECTOMY, VAGINAL  October 2014    MASTECTOMY Bilateral 08/19/2014    INSERTION OF TISSUE EXPANDERS    SHOULDER SURGERY Right 01/2015    WISDOM TOOTH EXTRACTION           Family History   Problem Relation Age of Onset    Cancer Paternal Grandmother 80        breast     Cancer Paternal Grandfather 79        prostate    Breast Cancer Mother     Colon Cancer Mother     No Known Problems Father

## 2023-10-20 ENCOUNTER — TELEPHONE (OUTPATIENT)
Dept: PRIMARY CARE CLINIC | Age: 53
End: 2023-10-20

## 2023-10-20 NOTE — TELEPHONE ENCOUNTER
Please let the patient know that blood work results showed    Urine analysis showed white blood cells but otherwise was normal.  Uncertain as the cause or significance of white cells in the urine    Cholesterol levels were elevated and triglyceride levels were elevated The 10-year ASCVD risk score (Mary TANNER, et al., 2019) is: 1.3%, which is considered lower risk for heart vascular complications in the next 10 years. Calcium level was elevated and may be due to supplementation    Thyroid levels were normal    Hemoglobin A1c is a measure of 3-month sugar control was normal at 4.9.   No evidence for prediabetes or diabetes    Vitamin D levels were normal    Blood counts were normal    Other electrolytes including magnesium, liver functions, and kidney function values were normal    Thanks

## 2023-10-23 NOTE — TELEPHONE ENCOUNTER
Patient notified and verbalized understanding. Patient wanted you to know that she was not fasting for these labs.

## 2024-10-21 ENCOUNTER — TELEPHONE (OUTPATIENT)
Dept: FAMILY MEDICINE CLINIC | Age: 54
End: 2024-10-21

## 2024-10-21 DIAGNOSIS — E78.2 MIXED HYPERLIPIDEMIA: Primary | ICD-10-CM

## 2024-10-21 DIAGNOSIS — E55.9 VITAMIN D DEFICIENCY: ICD-10-CM

## 2024-10-21 DIAGNOSIS — R53.83 OTHER FATIGUE: ICD-10-CM

## 2024-10-21 DIAGNOSIS — R73.9 HYPERGLYCEMIA: ICD-10-CM

## 2024-10-21 NOTE — TELEPHONE ENCOUNTER
Patients  called in and stated that patient has an appointment on 10/23/24 and would like to get labs done prior to. Can you please put orders in?

## 2024-10-21 NOTE — TELEPHONE ENCOUNTER
Orders Placed This Encounter   Procedures    Comprehensive Metabolic Panel     Standing Status:   Future     Standing Expiration Date:   10/21/2025    Magnesium     Standing Status:   Future     Standing Expiration Date:   10/21/2025    Lipid, Fasting     Standing Status:   Future     Standing Expiration Date:   10/21/2025    Hemoglobin A1C     Standing Status:   Future     Standing Expiration Date:   10/21/2025    Vitamin D 25 Hydroxy     Standing Status:   Future     Standing Expiration Date:   10/21/2025    TSH     Standing Status:   Future     Standing Expiration Date:   10/21/2025    Urinalysis     Standing Status:   Future     Standing Expiration Date:   10/21/2025    CBC with Auto Differential     Standing Status:   Future     Standing Expiration Date:   10/21/2025     Orders placed

## 2024-10-23 ENCOUNTER — OFFICE VISIT (OUTPATIENT)
Dept: PRIMARY CARE CLINIC | Age: 54
End: 2024-10-23
Payer: COMMERCIAL

## 2024-10-23 VITALS
DIASTOLIC BLOOD PRESSURE: 68 MMHG | OXYGEN SATURATION: 98 % | SYSTOLIC BLOOD PRESSURE: 118 MMHG | TEMPERATURE: 97.4 F | HEART RATE: 74 BPM | WEIGHT: 152 LBS | BODY MASS INDEX: 27.97 KG/M2 | HEIGHT: 62 IN

## 2024-10-23 DIAGNOSIS — E55.9 VITAMIN D DEFICIENCY: ICD-10-CM

## 2024-10-23 DIAGNOSIS — G47.09 OTHER INSOMNIA: ICD-10-CM

## 2024-10-23 DIAGNOSIS — R73.9 HYPERGLYCEMIA: ICD-10-CM

## 2024-10-23 DIAGNOSIS — R31.29 MICROHEMATURIA: ICD-10-CM

## 2024-10-23 DIAGNOSIS — E78.2 MIXED HYPERLIPIDEMIA: ICD-10-CM

## 2024-10-23 DIAGNOSIS — Z00.00 ENCOUNTER FOR WELL ADULT EXAM WITHOUT ABNORMAL FINDINGS: Primary | ICD-10-CM

## 2024-10-23 DIAGNOSIS — R53.83 OTHER FATIGUE: ICD-10-CM

## 2024-10-23 DIAGNOSIS — M81.0 AGE-RELATED OSTEOPOROSIS WITHOUT CURRENT PATHOLOGICAL FRACTURE: ICD-10-CM

## 2024-10-23 LAB
ALBUMIN: 4.8 G/DL (ref 3.5–5.2)
ALP BLD-CCNC: 59 U/L (ref 35–104)
ALT SERPL-CCNC: 22 U/L (ref 0–32)
ANION GAP SERPL CALCULATED.3IONS-SCNC: 11 MMOL/L (ref 7–16)
AST SERPL-CCNC: 25 U/L (ref 0–31)
BACTERIA: ABNORMAL
BASOPHILS ABSOLUTE: 0.05 K/UL (ref 0–0.2)
BASOPHILS RELATIVE PERCENT: 1 % (ref 0–2)
BILIRUB SERPL-MCNC: 0.3 MG/DL (ref 0–1.2)
BILIRUBIN, URINE: NEGATIVE
BUN BLDV-MCNC: 18 MG/DL (ref 6–20)
CALCIUM SERPL-MCNC: 10.2 MG/DL (ref 8.6–10.2)
CHLORIDE BLD-SCNC: 102 MMOL/L (ref 98–107)
CHOLESTEROL, FASTING: 231 MG/DL
CO2: 29 MMOL/L (ref 22–29)
COLOR, UA: YELLOW
CREAT SERPL-MCNC: 0.8 MG/DL (ref 0.5–1)
EOSINOPHILS ABSOLUTE: 0.22 K/UL (ref 0.05–0.5)
EOSINOPHILS RELATIVE PERCENT: 4 % (ref 0–6)
EPITHELIAL CELLS, UA: ABNORMAL /HPF
GFR, ESTIMATED: 85 ML/MIN/1.73M2
GLUCOSE BLD-MCNC: 105 MG/DL (ref 74–99)
GLUCOSE URINE: NEGATIVE MG/DL
HBA1C MFR BLD: 5.2 % (ref 4–5.6)
HCT VFR BLD CALC: 42.9 % (ref 34–48)
HDLC SERPL-MCNC: 47 MG/DL
HEMOGLOBIN: 13.9 G/DL (ref 11.5–15.5)
IMMATURE GRANULOCYTES %: 0 % (ref 0–5)
IMMATURE GRANULOCYTES ABSOLUTE: <0.03 K/UL (ref 0–0.58)
KETONES, URINE: NEGATIVE MG/DL
LDL CHOLESTEROL: 155 MG/DL
LEUKOCYTE ESTERASE, URINE: ABNORMAL
LYMPHOCYTES ABSOLUTE: 1.9 K/UL (ref 1.5–4)
LYMPHOCYTES RELATIVE PERCENT: 30 % (ref 20–42)
MAGNESIUM: 2.3 MG/DL (ref 1.6–2.6)
MCH RBC QN AUTO: 30 PG (ref 26–35)
MCHC RBC AUTO-ENTMCNC: 32.4 G/DL (ref 32–34.5)
MCV RBC AUTO: 92.5 FL (ref 80–99.9)
MONOCYTES ABSOLUTE: 0.27 K/UL (ref 0.1–0.95)
MONOCYTES RELATIVE PERCENT: 4 % (ref 2–12)
NEUTROPHILS ABSOLUTE: 3.88 K/UL (ref 1.8–7.3)
NEUTROPHILS RELATIVE PERCENT: 61 % (ref 43–80)
NITRITE, URINE: NEGATIVE
PDW BLD-RTO: 13 % (ref 11.5–15)
PH, URINE: 5.5 (ref 5–9)
PLATELET # BLD: 275 K/UL (ref 130–450)
PMV BLD AUTO: 12.2 FL (ref 7–12)
POTASSIUM SERPL-SCNC: 4.3 MMOL/L (ref 3.5–5)
PROTEIN UA: NEGATIVE MG/DL
RBC # BLD: 4.64 M/UL (ref 3.5–5.5)
RBC UA: ABNORMAL /HPF
SODIUM BLD-SCNC: 142 MMOL/L (ref 132–146)
SPECIFIC GRAVITY UA: 1.02 (ref 1–1.03)
TOTAL PROTEIN: 7.8 G/DL (ref 6.4–8.3)
TRIGLYCERIDE, FASTING: 145 MG/DL
TSH SERPL DL<=0.05 MIU/L-ACNC: 1.33 UIU/ML (ref 0.27–4.2)
TURBIDITY: CLEAR
URINE HGB: ABNORMAL
UROBILINOGEN, URINE: 0.2 EU/DL (ref 0–1)
VITAMIN D 25-HYDROXY: 38.1 NG/ML (ref 30–100)
VLDLC SERPL CALC-MCNC: 29 MG/DL
WBC # BLD: 6.3 K/UL (ref 4.5–11.5)
WBC UA: ABNORMAL /HPF

## 2024-10-23 PROCEDURE — 99396 PREV VISIT EST AGE 40-64: CPT | Performed by: INTERNAL MEDICINE

## 2024-10-23 PROCEDURE — G8484 FLU IMMUNIZE NO ADMIN: HCPCS | Performed by: INTERNAL MEDICINE

## 2024-10-23 SDOH — ECONOMIC STABILITY: INCOME INSECURITY: HOW HARD IS IT FOR YOU TO PAY FOR THE VERY BASICS LIKE FOOD, HOUSING, MEDICAL CARE, AND HEATING?: NOT HARD AT ALL

## 2024-10-23 SDOH — ECONOMIC STABILITY: FOOD INSECURITY: WITHIN THE PAST 12 MONTHS, YOU WORRIED THAT YOUR FOOD WOULD RUN OUT BEFORE YOU GOT MONEY TO BUY MORE.: NEVER TRUE

## 2024-10-23 SDOH — ECONOMIC STABILITY: FOOD INSECURITY: WITHIN THE PAST 12 MONTHS, THE FOOD YOU BOUGHT JUST DIDN'T LAST AND YOU DIDN'T HAVE MONEY TO GET MORE.: NEVER TRUE

## 2024-10-23 ASSESSMENT — ENCOUNTER SYMPTOMS
NAUSEA: 0
SHORTNESS OF BREATH: 0
VOMITING: 0
EYE PAIN: 0
CONSTIPATION: 0
BLOOD IN STOOL: 0
CHEST TIGHTNESS: 0
SORE THROAT: 0
ABDOMINAL PAIN: 0
DIARRHEA: 0
RHINORRHEA: 0
COUGH: 0

## 2024-10-23 ASSESSMENT — PATIENT HEALTH QUESTIONNAIRE - PHQ9
SUM OF ALL RESPONSES TO PHQ QUESTIONS 1-9: 0
SUM OF ALL RESPONSES TO PHQ QUESTIONS 1-9: 0
2. FEELING DOWN, DEPRESSED OR HOPELESS: NOT AT ALL
1. LITTLE INTEREST OR PLEASURE IN DOING THINGS: NOT AT ALL
SUM OF ALL RESPONSES TO PHQ9 QUESTIONS 1 & 2: 0
SUM OF ALL RESPONSES TO PHQ QUESTIONS 1-9: 0
SUM OF ALL RESPONSES TO PHQ QUESTIONS 1-9: 0

## 2024-10-23 NOTE — PROGRESS NOTES
Well Adult Note  Name: Arabella Jackson Today’s Date: 10/23/2024   MRN: [id number] Sex: Female   Age: 54 y.o. Ethnicity: Non- / Non    : 1970 Race: White (non-)      Arabella Jackson is here for a well adult exam.       Subjective   History:    - States has been having worsening anxiety. States when out of routine gets very anxiety     - States to have urology surgery formid-urethral sling, cystoscopy at [facility]. States s/p Placement of midurethral sling (retropubic) and Cystoscopy (2023). States symptoms have improved.      - States has been having generalized pruritus. Non drowsy antihistamine in am. Stable.      - Has anxiety. Declines treatment.     - has high cholesterol. Not on medications. The 10-year ASCVD risk score (Mary TANNER, et al., 2019) is: 2% (10/2024)      - States had bone scan for bone density. States improved bone density. States osteopenia. On Prolia through gyn (started 2022), last (2024)        - States still with hot flashes. Hot flashes are less often. Stable.     - States has had breast cancer. No longer following with oncology. New oncologist is Dr. Mcdaniel. Remission. States oncology stopped Arimidex, Stopped  letrozole was on for total of 5 years.      - States chronic headaches. States has migraines. States weaned off Topamax. States almost daily regular headaches. Not interested in prophylactic medications. Declines referral      - States having acid reflux is improved. Stopped pantoprazole. Watching Diet. States occasionally takes tums.      - States having numbness or hands - States uncertain carpal tunnel.      - States has been having mid back pain. States feels has if someone is poking with hot fingers. Symptoms fluctuating. States worse with sitting. States has seen chiropractor.       - States right humeral fracture. Hardware removed. Previous surgery was ORIF with plates and screws. States arm feels better. Still with decreased strength.  States

## 2024-10-23 NOTE — PATIENT INSTRUCTIONS
use can make your bones thinner.  Talk to your doctor about any special risks you might have, such as having a close relative with osteoporosis or taking a medicine that can weaken bones. Your doctor can tell you the best ways to protect your bones from thinning.  Follow-up care is a key part of your treatment and safety. Be sure to make and go to all appointments, and call your doctor if you are having problems. It's also a good idea to know your test results and keep a list of the medicines you take.  How can you care for yourself at home?  Get enough calcium and vitamin D. The [place] [employer] recommends adults younger than age 51 need 1,000 mg of calcium and 600 IU of vitamin D each day. Women ages 51 to 70 need 1,200 mg of calcium and 600 IU of vitamin D each day. Men ages 51 to 70 need 1,000 mg of calcium and 600 IU of vitamin D each day. Adults 71 and older need 1,200 mg of calcium and 800 IU of vitamin D each day.  Eat foods rich in calcium, like yogurt, cheese, milk, and dark green vegetables.  Eat foods rich in vitamin D, like eggs, fatty fish, cereal, and fortified milk.  Get some sunshine. Your body uses sunshine to make its own vitamin D. The safest time to be out in the sun is before 10 a.m. or after 3 p.m. Avoid getting sunburned. Sunburn can increase your risk of skin cancer.  Talk to your doctor about taking a calcium plus vitamin D supplement. Ask about what type of calcium is right for you, and how much to take at a time. Adults ages 19 to 50 should not get more than 2,500 mg of calcium and 4,000 IU of vitamin D each day, whether it is from supplements and/or food. Adults ages 51 and older should not get more than 2,000 mg of calcium and 4,000 IU of vitamin D each day from supplements and/or food.  Get regular bone-building exercise. Weight-bearing and resistance exercises keep bones healthy by working the muscles and bones against gravity. Start out at an exercise level that feels right for

## 2024-11-22 DIAGNOSIS — R31.29 MICROHEMATURIA: ICD-10-CM

## 2024-11-22 LAB
BACTERIA: ABNORMAL
BILIRUBIN, URINE: NEGATIVE
COLOR, UA: YELLOW
EPITHELIAL CELLS, UA: ABNORMAL /HPF
GLUCOSE URINE: NEGATIVE MG/DL
KETONES, URINE: NEGATIVE MG/DL
LEUKOCYTE ESTERASE, URINE: ABNORMAL
NITRITE, URINE: NEGATIVE
PH, URINE: 6 (ref 5–9)
PROTEIN UA: NEGATIVE MG/DL
RBC UA: ABNORMAL /HPF
RENAL EPITHELIAL, UA: PRESENT /HPF
SPECIFIC GRAVITY UA: 1.01 (ref 1–1.03)
TURBIDITY: CLEAR
URINE HGB: ABNORMAL
UROBILINOGEN, URINE: 0.2 EU/DL (ref 0–1)
WBC UA: ABNORMAL /HPF

## 2024-11-23 LAB
CULTURE: NORMAL
SPECIMEN DESCRIPTION: NORMAL

## 2024-11-25 ENCOUNTER — TELEPHONE (OUTPATIENT)
Dept: FAMILY MEDICINE CLINIC | Age: 54
End: 2024-11-25

## 2024-11-25 DIAGNOSIS — R31.29 MICROHEMATURIA: Primary | ICD-10-CM

## 2024-11-25 NOTE — TELEPHONE ENCOUNTER
Please let the patient know that urine analysis showed white blood cells microscopic blood bacteria and skin cells.     Skin cells can indicate possible contamination of the sample not being sterile.     Urine culture did grow bacteria but in a very small amounts and no dominant bacteria was isolated making the results of uncertain significance    If having symptoms and because of the persistent blood may consider urology evaluation    Thanks

## 2024-11-25 NOTE — TELEPHONE ENCOUNTER
Orders Placed This Encounter   Procedures    [facility] - Rigo Booth MD, Urology, [place]     Referral Priority:   Routine     Referral Type:   Eval and Treat     Referral Reason:   Specialty Services Required     Referred to Provider:   Rigo Booth MD     Requested Specialty:   Urology     Number of Visits Requested:   1     Referral placed

## 2024-11-25 NOTE — TELEPHONE ENCOUNTER
Patient notified of lab results.  Patient said this is basically the exact same results as the last urinalysis and she asked if it is something she is doing wrong.  I asked her if she used the wipes to cleanse herself prior to leaving the sample and she stated that no wipes were given to her to use either time that she has left a sample.  However, due to the persistence of blood, she would like to go ahead and be referred to urology.  She would like to see someone in the [place] or [place] area.

## 2024-12-02 NOTE — TELEPHONE ENCOUNTER
Received incoming call from patient. Spoke with patient. Patient stated, \"I received a call last week and was told that Dr. Alexandra is going to send a referral for me to urology. Can you check if that was placed? I haven't heard from them.\" Informed patient that Dr. Alexandra did place a referral to Rigo Booth MD, Urology, [place]. Patient verbalized understanding. Patient stated, \"OK thank you. I will wait for them to call me and if I don't hear from them I will call them.\" Encouraged patient to call the office back with any further questions. Patient agreeable to this.

## 2025-01-13 ENCOUNTER — HOSPITAL ENCOUNTER (OUTPATIENT)
Dept: ULTRASOUND IMAGING | Age: 55
Discharge: HOME | End: 2025-01-15
Attending: UROLOGY
Payer: COMMERCIAL

## 2025-01-13 DIAGNOSIS — R31.29 OTHER MICROSCOPIC HEMATURIA: ICD-10-CM

## 2025-01-13 PROCEDURE — 76770 US EXAM ABDO BACK WALL COMP: CPT

## 2025-01-17 RX ORDER — ALBUTEROL SULFATE 90 UG/1
4 INHALANT RESPIRATORY (INHALATION) PRN
OUTPATIENT
Start: 2025-01-20

## 2025-01-17 RX ORDER — DIPHENHYDRAMINE HYDROCHLORIDE 50 MG/ML
50 INJECTION INTRAMUSCULAR; INTRAVENOUS
OUTPATIENT
Start: 2025-01-20

## 2025-01-17 RX ORDER — ACETAMINOPHEN 325 MG/1
650 TABLET ORAL ONCE
OUTPATIENT
Start: 2025-01-20 | End: 2025-01-20

## 2025-01-17 RX ORDER — SODIUM CHLORIDE 9 MG/ML
INJECTION, SOLUTION INTRAVENOUS CONTINUOUS
OUTPATIENT
Start: 2025-01-20

## 2025-01-17 RX ORDER — ACETAMINOPHEN 325 MG/1
650 TABLET ORAL
OUTPATIENT
Start: 2025-01-20

## 2025-01-17 RX ORDER — HYDROCORTISONE SODIUM SUCCINATE 100 MG/2ML
100 INJECTION INTRAMUSCULAR; INTRAVENOUS
OUTPATIENT
Start: 2025-01-20

## 2025-01-17 RX ORDER — EPINEPHRINE 1 MG/ML
0.3 INJECTION, SOLUTION, CONCENTRATE INTRAVENOUS PRN
OUTPATIENT
Start: 2025-01-20

## 2025-01-17 RX ORDER — ONDANSETRON 2 MG/ML
8 INJECTION INTRAMUSCULAR; INTRAVENOUS
OUTPATIENT
Start: 2025-01-20

## 2025-02-10 ENCOUNTER — HOSPITAL ENCOUNTER (OUTPATIENT)
Dept: INFUSION THERAPY | Age: 55
Setting detail: INFUSION SERIES
Discharge: HOME | End: 2025-02-10
Payer: COMMERCIAL

## 2025-02-10 VITALS
SYSTOLIC BLOOD PRESSURE: 111 MMHG | BODY MASS INDEX: 28.7 KG/M2 | DIASTOLIC BLOOD PRESSURE: 63 MMHG | HEART RATE: 97 BPM | RESPIRATION RATE: 18 BRPM | WEIGHT: 152 LBS | HEIGHT: 61 IN | TEMPERATURE: 96.8 F | OXYGEN SATURATION: 99 %

## 2025-02-10 DIAGNOSIS — M81.0 AGE-RELATED OSTEOPOROSIS WITHOUT CURRENT PATHOLOGICAL FRACTURE: Primary | ICD-10-CM

## 2025-02-10 PROCEDURE — 96372 THER/PROPH/DIAG INJ SC/IM: CPT

## 2025-02-10 PROCEDURE — 6360000002 HC RX W HCPCS: Performed by: NURSE PRACTITIONER

## 2025-02-10 RX ORDER — HYDROCORTISONE SODIUM SUCCINATE 100 MG/2ML
100 INJECTION INTRAMUSCULAR; INTRAVENOUS
OUTPATIENT
Start: 2025-08-11

## 2025-02-10 RX ORDER — ALBUTEROL SULFATE 90 UG/1
4 INHALANT RESPIRATORY (INHALATION) PRN
OUTPATIENT
Start: 2025-08-11

## 2025-02-10 RX ORDER — ACETAMINOPHEN 325 MG/1
650 TABLET ORAL ONCE
Status: DISCONTINUED | OUTPATIENT
Start: 2025-02-10 | End: 2025-02-11 | Stop reason: HOSPADM

## 2025-02-10 RX ORDER — ACETAMINOPHEN 325 MG/1
650 TABLET ORAL
OUTPATIENT
Start: 2025-08-11

## 2025-02-10 RX ORDER — ACETAMINOPHEN 325 MG/1
650 TABLET ORAL ONCE
Status: CANCELLED | OUTPATIENT
Start: 2025-08-11 | End: 2025-08-11

## 2025-02-10 RX ORDER — ONDANSETRON 2 MG/ML
8 INJECTION INTRAMUSCULAR; INTRAVENOUS
OUTPATIENT
Start: 2025-08-11

## 2025-02-10 RX ORDER — SODIUM CHLORIDE 9 MG/ML
INJECTION, SOLUTION INTRAVENOUS CONTINUOUS
OUTPATIENT
Start: 2025-08-11

## 2025-02-10 RX ORDER — EPINEPHRINE 1 MG/ML
0.3 INJECTION, SOLUTION, CONCENTRATE INTRAVENOUS PRN
OUTPATIENT
Start: 2025-08-11

## 2025-02-10 RX ORDER — DIPHENHYDRAMINE HYDROCHLORIDE 50 MG/ML
50 INJECTION INTRAMUSCULAR; INTRAVENOUS
OUTPATIENT
Start: 2025-08-11

## 2025-02-10 RX ADMIN — DENOSUMAB 60 MG: 60 INJECTION SUBCUTANEOUS at 07:56

## 2025-08-11 ENCOUNTER — HOSPITAL ENCOUNTER (OUTPATIENT)
Dept: INFUSION THERAPY | Age: 55
Setting detail: INFUSION SERIES
Discharge: HOME | End: 2025-08-11
Payer: COMMERCIAL

## 2025-08-11 VITALS
HEART RATE: 71 BPM | TEMPERATURE: 98 F | RESPIRATION RATE: 16 BRPM | OXYGEN SATURATION: 98 % | SYSTOLIC BLOOD PRESSURE: 116 MMHG | DIASTOLIC BLOOD PRESSURE: 57 MMHG

## 2025-08-11 DIAGNOSIS — M81.0 AGE-RELATED OSTEOPOROSIS WITHOUT CURRENT PATHOLOGICAL FRACTURE: Primary | ICD-10-CM

## 2025-08-11 PROCEDURE — 6360000002 HC RX W HCPCS: Performed by: NURSE PRACTITIONER

## 2025-08-11 PROCEDURE — 96372 THER/PROPH/DIAG INJ SC/IM: CPT

## 2025-08-11 RX ORDER — ALBUTEROL SULFATE 90 UG/1
4 INHALANT RESPIRATORY (INHALATION) PRN
OUTPATIENT
Start: 2026-02-09

## 2025-08-11 RX ORDER — DIPHENHYDRAMINE HYDROCHLORIDE 50 MG/ML
50 INJECTION, SOLUTION INTRAMUSCULAR; INTRAVENOUS
OUTPATIENT
Start: 2026-02-09

## 2025-08-11 RX ORDER — HYDROCORTISONE SODIUM SUCCINATE 100 MG/2ML
100 INJECTION INTRAMUSCULAR; INTRAVENOUS
OUTPATIENT
Start: 2026-02-09

## 2025-08-11 RX ORDER — SODIUM CHLORIDE 9 MG/ML
INJECTION, SOLUTION INTRAVENOUS CONTINUOUS
OUTPATIENT
Start: 2026-02-09

## 2025-08-11 RX ORDER — ONDANSETRON 2 MG/ML
8 INJECTION INTRAMUSCULAR; INTRAVENOUS
OUTPATIENT
Start: 2026-02-09

## 2025-08-11 RX ORDER — ACETAMINOPHEN 325 MG/1
650 TABLET ORAL
OUTPATIENT
Start: 2026-02-09

## 2025-08-11 RX ORDER — EPINEPHRINE 1 MG/ML
0.3 INJECTION, SOLUTION, CONCENTRATE INTRAVENOUS PRN
OUTPATIENT
Start: 2026-02-09

## 2025-08-11 RX ADMIN — DENOSUMAB 60 MG: 60 INJECTION SUBCUTANEOUS at 07:42

## 2025-08-11 ASSESSMENT — PAIN DESCRIPTION - FREQUENCY
FREQUENCY: INTERMITTENT
FREQUENCY: INTERMITTENT

## 2025-08-11 ASSESSMENT — PAIN DESCRIPTION - ONSET
ONSET: ON-GOING
ONSET: ON-GOING

## 2025-08-11 ASSESSMENT — PAIN SCALES - GENERAL: PAINLEVEL_OUTOF10: 8

## 2025-08-11 ASSESSMENT — PAIN DESCRIPTION - LOCATION: LOCATION: HIP

## 2025-08-11 ASSESSMENT — PAIN DESCRIPTION - ORIENTATION: ORIENTATION: RIGHT

## (undated) DEVICE — SNARE ENDOSCP L240CM LOOP W13MM SHTH DIA2.4MM SM OVL FLX

## (undated) DEVICE — TRAP POLYP ETRAP

## (undated) DEVICE — SPONGE GZ W4XL4IN RAYON POLY FILL CVR W/ NONWOVEN FAB STERILE

## (undated) DEVICE — GRADUATE TRIANG MEASURE 1000ML BLK PRNT